# Patient Record
Sex: MALE | Race: WHITE | NOT HISPANIC OR LATINO | URBAN - METROPOLITAN AREA
[De-identification: names, ages, dates, MRNs, and addresses within clinical notes are randomized per-mention and may not be internally consistent; named-entity substitution may affect disease eponyms.]

---

## 2017-01-29 DIAGNOSIS — M26.629 TMJ ARTHRALGIA: ICD-10-CM

## 2017-01-30 RX ORDER — MELOXICAM 15 MG/1
TABLET ORAL
Qty: 30 TABLET | Refills: 3 | OUTPATIENT
Start: 2017-01-30

## 2017-03-06 ENCOUNTER — OFFICE VISIT (OUTPATIENT)
Dept: INTERNAL MEDICINE | Age: 57
End: 2017-03-06

## 2017-03-06 VITALS
BODY MASS INDEX: 26.68 KG/M2 | SYSTOLIC BLOOD PRESSURE: 120 MMHG | DIASTOLIC BLOOD PRESSURE: 76 MMHG | WEIGHT: 190.6 LBS | RESPIRATION RATE: 12 BRPM | HEIGHT: 71 IN | HEART RATE: 72 BPM

## 2017-03-06 DIAGNOSIS — M26.623 BILATERAL TEMPOROMANDIBULAR JOINT PAIN: ICD-10-CM

## 2017-03-06 DIAGNOSIS — I10 BENIGN ESSENTIAL HTN: Primary | ICD-10-CM

## 2017-03-06 DIAGNOSIS — M54.2 CERVICALGIA: ICD-10-CM

## 2017-03-06 PROCEDURE — 99214 OFFICE O/P EST MOD 30 MIN: CPT | Performed by: INTERNAL MEDICINE

## 2017-03-06 RX ORDER — MELOXICAM 15 MG/1
15 TABLET ORAL DAILY
Qty: 90 TABLET | Refills: 1 | Status: SHIPPED | OUTPATIENT
Start: 2017-03-06 | End: 2017-09-07 | Stop reason: SDUPTHER

## 2017-03-06 RX ORDER — METHYLPREDNISOLONE 4 MG/1
TABLET ORAL
Qty: 21 TABLET | Refills: 0 | Status: SHIPPED | OUTPATIENT
Start: 2017-03-06 | End: 2017-04-24

## 2017-03-06 RX ORDER — VALSARTAN 320 MG/1
320 TABLET ORAL DAILY
Qty: 90 TABLET | Refills: 1 | Status: SHIPPED | OUTPATIENT
Start: 2017-03-06 | End: 2017-09-05 | Stop reason: SDUPTHER

## 2017-03-06 NOTE — PROGRESS NOTES
Antony Gimenez is a 56 y.o. male who presents with   Chief Complaint   Patient presents with   • Neck Pain     Neck pain for several weeks with radiation into the right arm manifest as tingling.  This happens when he flexes his neck he says.   • Hypertension     Check blood pressure.  Blood pressures at home averaging around 120/70.   .    Neck Pain    This is a new problem. The current episode started 1 to 4 weeks ago. The problem has been unchanged. The pain is present in the right side. The quality of the pain is described as burning and aching. The symptoms are aggravated by bending. Associated symptoms include numbness. He has tried nothing for the symptoms.   Hypertension   This is a chronic problem. The current episode started more than 1 year ago. The problem is controlled. Associated symptoms include neck pain. Past treatments include angiotensin blockers. The current treatment provides moderate improvement. There are no compliance problems.         The following portions of the patient's history were reviewed and updated as appropriate: allergies, current medications, past medical history and problem list.    Review of Systems   Constitutional: Negative.    HENT: Negative.    Eyes: Negative.    Respiratory: Negative.    Cardiovascular: Negative.    Genitourinary: Negative.    Musculoskeletal: Positive for neck pain.   Skin: Negative.    Neurological: Positive for numbness.   Psychiatric/Behavioral: Negative.        Objective   Physical Exam   Constitutional: He is oriented to person, place, and time. He appears well-developed and well-nourished. No distress.   HENT:   Head: Normocephalic and atraumatic.   Eyes: Conjunctivae and EOM are normal. Pupils are equal, round, and reactive to light.   Neck: Normal range of motion. Neck supple. No thyromegaly present.   Neck exam negative.  Carotid auscultation normal-no bruits heard.   Cardiovascular: Normal rate, regular rhythm, normal heart sounds and intact  distal pulses.  Exam reveals no gallop and no friction rub.    No murmur heard.  Pulmonary/Chest: Effort normal and breath sounds normal. No respiratory distress. He has no wheezes. He has no rales. He exhibits no tenderness.   Musculoskeletal: Normal range of motion. He exhibits no edema, tenderness or deformity.   Subjectively he has tingling in his right arm when he flexes his head but the overall motor/sensory exam in his upper extremities appears normal.  Distal pulsations appear normal on palpation in both upper extremities as well.   Neurological: He is alert and oriented to person, place, and time. No cranial nerve deficit. Coordination normal.   Psychiatric: He has a normal mood and affect. His behavior is normal. Judgment and thought content normal.   Nursing note and vitals reviewed.      Assessment/Plan   Antony was seen today for neck pain and hypertension.    Diagnoses and all orders for this visit:    Benign essential HTN  -     valsartan (DIOVAN) 320 MG tablet; Take 1 tablet by mouth Daily.    Bilateral temporomandibular joint pain  -     meloxicam (MOBIC) 15 MG tablet; Take 1 tablet by mouth Daily.    Cervicalgia  -     MRI Cervical Spine Without Contrast; Future  -     MethylPREDNISolone (MEDROL, JERI,) 4 MG tablet; Take as directed on package instructions.        Plan: Continue valsartan as above.  Refill given for #90 with one refill.    Continue meloxicam for TMJ.  Patient using dental orthosis for the problem.    Neck pain: Clinical history suggests cervicalgia with radiculopathy into the right arm.  We will get MRI to check it out in more detail, also we will prescribe a Medrol Dosepak for the symptoms.  Follow-up depending MRI findings and response to Medrol.  The patient does see [facility] for orthopedic issues he says.

## 2017-03-15 ENCOUNTER — HOSPITAL ENCOUNTER (OUTPATIENT)
Dept: MRI IMAGING | Facility: HOSPITAL | Age: 57
Discharge: HOME | End: 2017-03-15
Admitting: INTERNAL MEDICINE

## 2017-03-15 DIAGNOSIS — M54.2 CERVICALGIA: ICD-10-CM

## 2017-03-15 PROCEDURE — 82565 ASSAY OF CREATININE: CPT

## 2017-03-15 PROCEDURE — 72156 MRI NECK SPINE W/O & W/DYE: CPT

## 2017-03-15 PROCEDURE — 0 GADOBENATE DIMEGLUMINE 529 MG/ML SOLUTION: Performed by: INTERNAL MEDICINE

## 2017-03-15 PROCEDURE — A9577 INJ MULTIHANCE: HCPCS | Performed by: INTERNAL MEDICINE

## 2017-03-15 RX ADMIN — GADOBENATE DIMEGLUMINE 18 ML: 529 INJECTION, SOLUTION INTRAVENOUS at 18:20

## 2017-03-16 LAB — CREAT BLDA-MCNC: 0.9 MG/DL (ref 0.6–1.3)

## 2017-03-23 ENCOUNTER — TELEPHONE (OUTPATIENT)
Dept: INTERNAL MEDICINE | Age: 57
End: 2017-03-23

## 2017-03-23 NOTE — TELEPHONE ENCOUNTER
Called patient 3-23-17 @ 1:33 per  for the first time on the MRI, no note given prior to this one. Please place the referral to the neurosurgeon per the patient's request. Medication did not help the problem.

## 2017-03-23 NOTE — TELEPHONE ENCOUNTER
I have already responded to this once.  MRI shows disc bulging at C4-5; C5-6 with multilevel degenerative joint disease.  If current treatment has been ineffective then a neurosurgical referral may be necessary.

## 2017-03-27 DIAGNOSIS — M50.30 DISC DISEASE, DEGENERATIVE, CERVICAL: ICD-10-CM

## 2017-03-27 DIAGNOSIS — M50.90 CERVICAL DISC DISEASE: Primary | ICD-10-CM

## 2017-04-24 ENCOUNTER — OFFICE VISIT (OUTPATIENT)
Dept: NEUROSURGERY | Facility: CLINIC | Age: 57
End: 2017-04-24

## 2017-04-24 VITALS
HEART RATE: 70 BPM | BODY MASS INDEX: 26.74 KG/M2 | HEIGHT: 71 IN | DIASTOLIC BLOOD PRESSURE: 87 MMHG | SYSTOLIC BLOOD PRESSURE: 139 MMHG | WEIGHT: 191 LBS

## 2017-04-24 DIAGNOSIS — M50.120 CERVICAL DISC DISORDER WITH RADICULOPATHY OF MID-CERVICAL REGION: Primary | ICD-10-CM

## 2017-04-24 PROCEDURE — 99243 OFF/OP CNSLTJ NEW/EST LOW 30: CPT | Performed by: PHYSICIAN ASSISTANT

## 2017-04-24 NOTE — PROGRESS NOTES
Subjective   Patient ID: Antony Gimenez is a 56 y.o. male is being seen for consultation today at the request of Melvin Lambert MD on neck pain that radiates into the right arm down to the hand. He states that the pain is better but his mobility is worse. He is currently taking Mobic 15 mg PRN for pain.    Neck Pain    This is a recurrent problem. The current episode started more than 1 month ago. The problem occurs intermittently. The problem has been gradually improving. The pain is associated with nothing. The pain is present in the right side. The quality of the pain is described as shooting. The pain is at a severity of 2/10. The pain is mild. The symptoms are aggravated by position. Worse during: depends on movement. Stiffness is present all day. Associated symptoms include numbness and tingling. Pertinent negatives include no fever, headaches or weakness. He has tried NSAIDs for the symptoms. The treatment provided moderate relief.       The following portions of the patient's history were reviewed and updated as appropriate: allergies, current medications, past family history, past medical history, past social history, past surgical history and problem list.    Review of Systems   Constitutional: Negative for fever.   Genitourinary: Negative for difficulty urinating.   Musculoskeletal: Positive for neck pain and neck stiffness. Negative for back pain and gait problem.   Neurological: Positive for tingling and numbness. Negative for weakness and headaches.   All other systems reviewed and are negative.      Objective   Physical Exam   Constitutional: He is oriented to person, place, and time. He appears well-developed and well-nourished.   HENT:   Head: Normocephalic and atraumatic.   Right Ear: External ear normal.   Left Ear: External ear normal.   Eyes: Conjunctivae and EOM are normal. Pupils are equal, round, and reactive to light. Right eye exhibits no discharge. Left eye exhibits no discharge.   Neck:  Normal range of motion. Neck supple. No tracheal deviation present.   Cardiovascular: Normal rate and regular rhythm.    Pulmonary/Chest: Effort normal and breath sounds normal. No stridor. No respiratory distress.   Abdominal: Soft. Bowel sounds are normal.   Musculoskeletal: Normal range of motion. He exhibits no edema, tenderness or deformity.   Neurological: He is alert and oriented to person, place, and time. He has normal strength. He displays no atrophy, no tremor and normal reflexes. No cranial nerve deficit or sensory deficit. He exhibits normal muscle tone. He displays a negative Romberg sign. He displays no seizure activity. Coordination and gait normal.   Reflex Scores:       Tricep reflexes are 1+ on the right side and 1+ on the left side.       Bicep reflexes are 1+ on the right side and 1+ on the left side.       Brachioradialis reflexes are 1+ on the right side and 1+ on the left side.  No long tract signs    R handed   Skin: Skin is warm and dry.   Psychiatric: He has a normal mood and affect. His behavior is normal. Judgment and thought content normal.   Nursing note and vitals reviewed.    Neurologic Exam     Mental Status   Oriented to person, place, and time.     Cranial Nerves     CN III, IV, VI   Pupils are equal, round, and reactive to light.  Extraocular motions are normal.     Motor Exam     Strength   Strength 5/5 throughout.     Gait, Coordination, and Reflexes     Reflexes   Right brachioradialis: 1+  Left brachioradialis: 1+  Right biceps: 1+  Left biceps: 1+  Right triceps: 1+  Left triceps: 1+      Assessment/Plan   Independent Review of Radiographic Studies:    I did review the cervical spine MRI from March 15, 2017 which shows a right-sided disc protrusion at C4-C5 and C5-C6 with mild to moderate central stenosis but moderate to severe neural foraminal narrowing at both levels.  Medical Decision Making:    Mr. Gimenez was referred to us by Dr. Lambert for a 6 month history of right arm  pain and tingling.  The symptoms began actually over a year ago with some mild left-sided upper arm pain.  Those symptoms resolved without formal treatment but then shifted over to the right.  The pain is mild, described as electrical/tingling and occurs intermittently depending on the position of his head and neck.  He can alleviate the symptoms if he looks down and to the left and exacerbate his symptoms with rotation to the right.  He has noticed loss of range of motion in the neck but denies weakness or incontinence or gait issues.  The discomfort is mostly along the bicep and proximal forearm.  He has used NSAIDs with some relief (Mobic).     I did review the MRI images with the patient.  At this point his pain is quite mild and he does not have any deficits.  He understands that if he begins to experience weakness or conservative management fails to address the radicular pain then surgery will be indicated.  However I do think it is worthwhile to try some conservative management and we discussed both therapy and epidurals.  Since his pain is mild I think therapy would be a much wiser choice and the patient did agree.  He will try therapy for 6 weeks and then return to see us.  He can call in the interim with any questions or concerns or any worsening or new symptoms.  Antony was seen today for neck pain.    Diagnoses and all orders for this visit:    Cervical disc disorder with radiculopathy of mid-cervical region  -     Ambulatory Referral to Physical Therapy Evaluate and treat (2-3 times per week for 6wks, light traction, ROM, modalities, strength)    Return in about 6 weeks (around 6/5/2017) for with Dr. Santa.

## 2017-07-05 ENCOUNTER — OFFICE VISIT (OUTPATIENT)
Dept: NEUROSURGERY | Facility: CLINIC | Age: 57
End: 2017-07-05

## 2017-07-05 VITALS
HEART RATE: 67 BPM | DIASTOLIC BLOOD PRESSURE: 91 MMHG | BODY MASS INDEX: 25.76 KG/M2 | HEIGHT: 71 IN | SYSTOLIC BLOOD PRESSURE: 132 MMHG | WEIGHT: 184 LBS

## 2017-07-05 DIAGNOSIS — R29.898 WEAKNESS OF RIGHT ARM: ICD-10-CM

## 2017-07-05 DIAGNOSIS — M50.121 CERVICAL DISC DISORDER AT C4-C5 LEVEL WITH RADICULOPATHY: Primary | ICD-10-CM

## 2017-07-05 DIAGNOSIS — M50.122 CERVICAL DISC DISORDER AT C5-C6 LEVEL WITH RADICULOPATHY: ICD-10-CM

## 2017-07-05 PROCEDURE — 99213 OFFICE O/P EST LOW 20 MIN: CPT | Performed by: NEUROLOGICAL SURGERY

## 2017-07-05 NOTE — PROGRESS NOTES
Subjective   Patient ID: Antony Gimenez is a 56 y.o. male is here today for follow-up of neck pain.    At the patient's last visit, he was referred to physical therapy.    Today, the patient notes that the therapy seems to be helping.  He notes a persistent buzzing sensation in his right arm.  He notes that he would also like to discuss his other bulging discs.  He notes persistent neck stiffness and decreased range of motion.      Neck Pain    This is a chronic problem. The current episode started more than 1 month ago. The problem occurs daily. The problem has been gradually improving. Pertinent negatives include no fever.       The following portions of the patient's history were reviewed and updated as appropriate: allergies, current medications, past family history, past medical history, past social history, past surgical history and problem list.    Review of Systems   Constitutional: Negative for fever.   Musculoskeletal: Positive for neck pain.   Neurological: Negative for syncope.   All other systems reviewed and are negative.    This patient is otherwise healthy.  He doesn't really have much pain, occasional stiffness in the neck.  He has about a year history of right shoulder and arm numbness and tingling.  He thinks there has been low bit of weakness over the last 2 months.  He is been to physical therapy.  Traction does seem to help.  Home cervical traction unit would be reasonable.  There is a difference in the size of his right versus left biceps.  I measured that.  There is a bit of weakness in the C6 root.  He does have 2 level cervical disc disease with osteophytic root impingement at C4-C5 and C5-C6.  I'm concerned about the mild atrophy in the weakness.  We'll give it 2 more months and have him monitor strength and finish out therapy.  If there is no significant forward progress, then a two-level ACDF at C4-C5 and C5-C6 best option.        Objective   Physical Exam   Constitutional: He is  oriented to person, place, and time. He appears well-developed and well-nourished.   HENT:   Head: Normocephalic and atraumatic.   Eyes: Conjunctivae and EOM are normal. Pupils are equal, round, and reactive to light.   Fundoscopic exam:       The right eye shows no papilledema. The right eye shows venous pulsations.        The left eye shows no papilledema. The left eye shows venous pulsations.   Neck: Carotid bruit is not present.   Neurological: He is oriented to person, place, and time. He has a normal Finger-Nose-Finger Test and a normal Heel to Shin Test. Gait normal.   Reflex Scores:       Tricep reflexes are 2+ on the right side and 2+ on the left side.       Bicep reflexes are 2+ on the right side and 2+ on the left side.       Brachioradialis reflexes are 2+ on the right side and 2+ on the left side.       Patellar reflexes are 2+ on the right side and 2+ on the left side.       Achilles reflexes are 2+ on the right side and 2+ on the left side.  Psychiatric: His speech is normal.     Neurologic Exam     Mental Status   Oriented to person, place, and time.   Registration of memory: Good recent and remote memory.   Attention: normal. Concentration: normal.   Speech: speech is normal   Level of consciousness: alert  Knowledge: consistent with education.     Cranial Nerves     CN II   Visual fields full to confrontation.   Visual acuity: normal    CN III, IV, VI   Pupils are equal, round, and reactive to light.  Extraocular motions are normal.     CN V   Facial sensation intact.   Right corneal reflex: normal  Left corneal reflex: normal    CN VII   Facial expression full, symmetric.   Right facial weakness: none  Left facial weakness: none    CN VIII   Hearing: intact    CN IX, X   Palate: symmetric    CN XI   Right sternocleidomastoid strength: normal  Left sternocleidomastoid strength: normal    CN XII   Tongue: not atrophic  Tongue deviation: none    Motor Exam   Muscle bulk: normal  Right arm tone:  normal  Left arm tone: normal  Right leg tone: normal  Left leg tone: normal    Strength   Strength 5/5 except as noted.   Right biceps: 4/5       Right bicep was 34 cm in circumference, left biceps is 34.75 cm in circumference.     Sensory Exam   Light touch normal.     Gait, Coordination, and Reflexes     Gait  Gait: normal    Coordination   Finger to nose coordination: normal  Heel to shin coordination: normal    Reflexes   Right brachioradialis: 2+  Left brachioradialis: 2+  Right biceps: 2+  Left biceps: 2+  Right triceps: 2+  Left triceps: 2+  Right patellar: 2+  Left patellar: 2+  Right achilles: 2+  Left achilles: 2+  Right : 2+  Left : 2+      Assessment/Plan   Independent Review of Radiographic Studies:    The cervical MRI was reviewed which did show osteophytic cervical disc disease at C4-C5 and C5-C6 with right sided nerve impingement.  I agree with the report.      Medical Decision Making:    Even though he doesn't have a lot of pain the differences in the biceps and some of the very mild weakness is of concern.  He'll finish out his physical therapy.  He will monitor strength when he works out.  I did give him a prescription from home pump up cervical traction unit.  We'll see him in 2 months.  If he doesn't feel the strength in the right bicep is improved and I think we need to talk about doing an ACDF at C4-C5 and C5-C6.      Antony was seen today for neck pain.    Diagnoses and all orders for this visit:    Cervical disc disorder at C4-C5 level with radiculopathy    Cervical disc disorder at C5-C6 level with radiculopathy    Weakness of right arm    Return in about 2 months (around 9/5/2017).

## 2017-09-05 ENCOUNTER — OFFICE VISIT (OUTPATIENT)
Dept: INTERNAL MEDICINE | Age: 57
End: 2017-09-05

## 2017-09-05 VITALS
RESPIRATION RATE: 12 BRPM | HEIGHT: 71 IN | HEART RATE: 70 BPM | DIASTOLIC BLOOD PRESSURE: 60 MMHG | BODY MASS INDEX: 26.18 KG/M2 | SYSTOLIC BLOOD PRESSURE: 110 MMHG | WEIGHT: 187 LBS

## 2017-09-05 DIAGNOSIS — I10 BENIGN ESSENTIAL HTN: ICD-10-CM

## 2017-09-05 PROCEDURE — 99213 OFFICE O/P EST LOW 20 MIN: CPT | Performed by: INTERNAL MEDICINE

## 2017-09-05 RX ORDER — VALSARTAN 320 MG/1
320 TABLET ORAL DAILY
Qty: 90 TABLET | Refills: 1 | Status: SHIPPED | OUTPATIENT
Start: 2017-09-05 | End: 2018-02-24 | Stop reason: SDUPTHER

## 2017-09-05 NOTE — PROGRESS NOTES
Antony Gimenez is a 56 y.o. male who presents with   Chief Complaint   Patient presents with   • Hypertension     Patient presents for a blood pressure checkup.  He is going to run out of medication in a few days and also will be leaving town for vacation.  He is due back on 25 September for a checkup/lab update visit.   .    Hypertension   This is a chronic problem. The current episode started more than 1 year ago. The problem is controlled. Past treatments include angiotensin blockers. The current treatment provides moderate improvement. There are no compliance problems.         The following portions of the patient's history were reviewed and updated as appropriate: current medications and problem list.    Review of Systems   Constitutional: Negative.    Respiratory: Negative.    Cardiovascular: Negative.    Neurological: Negative.    Psychiatric/Behavioral: Negative.        Objective   Physical Exam   Constitutional: He is oriented to person, place, and time. He appears well-developed and well-nourished.   HENT:   Head: Normocephalic and atraumatic.   Eyes: EOM are normal. Pupils are equal, round, and reactive to light.   Cardiovascular: Normal rate.    Pulmonary/Chest: Effort normal.   Neurological: He is alert and oriented to person, place, and time.   Psychiatric: He has a normal mood and affect. His behavior is normal. Judgment and thought content normal.   Nursing note and vitals reviewed.      Assessment/Plan   Antony was seen today for hypertension.    Diagnoses and all orders for this visit:    Benign essential HTN        Plan: Blood pressure well controlled on current treatment.  Continue the same.  Return for his six-month checkup/lab update visit on September 25 as planned.

## 2017-09-06 DIAGNOSIS — I10 BENIGN ESSENTIAL HTN: ICD-10-CM

## 2017-09-06 RX ORDER — VALSARTAN 320 MG/1
TABLET ORAL
Qty: 90 TABLET | Refills: 1 | Status: SHIPPED | OUTPATIENT
Start: 2017-09-06 | End: 2018-07-30

## 2017-09-07 DIAGNOSIS — M26.623 BILATERAL TEMPOROMANDIBULAR JOINT PAIN: ICD-10-CM

## 2017-09-08 RX ORDER — MELOXICAM 15 MG/1
15 TABLET ORAL DAILY
Qty: 30 TABLET | Refills: 1 | Status: SHIPPED | OUTPATIENT
Start: 2017-09-08 | End: 2017-11-16 | Stop reason: SDUPTHER

## 2017-09-25 ENCOUNTER — OFFICE VISIT (OUTPATIENT)
Dept: NEUROSURGERY | Facility: CLINIC | Age: 57
End: 2017-09-25

## 2017-09-25 VITALS
HEART RATE: 65 BPM | BODY MASS INDEX: 26.18 KG/M2 | HEIGHT: 71 IN | SYSTOLIC BLOOD PRESSURE: 137 MMHG | WEIGHT: 187 LBS | DIASTOLIC BLOOD PRESSURE: 79 MMHG

## 2017-09-25 DIAGNOSIS — M50.122 CERVICAL DISC DISORDER AT C5-C6 LEVEL WITH RADICULOPATHY: ICD-10-CM

## 2017-09-25 DIAGNOSIS — M50.121 CERVICAL DISC DISORDER AT C4-C5 LEVEL WITH RADICULOPATHY: Primary | ICD-10-CM

## 2017-09-25 DIAGNOSIS — R29.898 WEAKNESS OF RIGHT ARM: ICD-10-CM

## 2017-09-25 PROCEDURE — 99213 OFFICE O/P EST LOW 20 MIN: CPT | Performed by: NEUROLOGICAL SURGERY

## 2017-09-25 NOTE — PROGRESS NOTES
Subjective   Patient ID: Antony Gimenez is a 56 y.o. male is here today for follow-up on neck pain.    At last visit the patient stated that the physical therapy was helping with his pain. He still complained of a buzzing sensation in the right arm. He was having persistent neck stiffness and decreased range of motion.    Today the patient states that there have been no changes. He still has the neck stiffness and weakness in the right arm.    Neck Pain    This is a chronic problem. The current episode started more than 1 year ago. The problem occurs daily. The problem has been unchanged. Stiffness is present all day. Associated symptoms include weakness (right arm).       The following portions of the patient's history were reviewed and updated as appropriate: allergies, current medications, past family history, past medical history, past social history, past surgical history and problem list.    Review of Systems   Musculoskeletal: Positive for neck pain.   Neurological: Positive for weakness (right arm).   All other systems reviewed and are negative.    I have been following this patient for osteophytic cervical disk disease at C4-C5 and C5-C6.  There really has not been much in the way of pain, but there was more weakness and some asymmetry in his muscle bulk.  There is a little bit of atrophy on the right bicep, but he does not think the weakness has changed much.  It is quite subtle just about 4+ over 5 in the right bicep compared to the left.  He feels he can live with this.  I told him it could get worse and if it does he is to let us know. Also if he develops more pain in his neck and right arm, he should let us know.  Otherwise, we can keep it open ended.  The symptoms are not debilitating and he can live his life fairly normally.        Objective   Physical Exam   Constitutional: He is oriented to person, place, and time. He appears well-developed and well-nourished.   HENT:   Head: Normocephalic and  atraumatic.   Eyes: Conjunctivae and EOM are normal. Pupils are equal, round, and reactive to light.   Fundoscopic exam:       The right eye shows no papilledema. The right eye shows venous pulsations.        The left eye shows no papilledema. The left eye shows venous pulsations.   Neck: Carotid bruit is not present.   Neurological: He is oriented to person, place, and time. He has a normal Finger-Nose-Finger Test and a normal Heel to Shin Test. Gait normal.   Reflex Scores:       Tricep reflexes are 2+ on the right side and 2+ on the left side.       Bicep reflexes are 2+ on the right side and 2+ on the left side.       Brachioradialis reflexes are 2+ on the right side and 2+ on the left side.       Patellar reflexes are 2+ on the right side and 2+ on the left side.       Achilles reflexes are 2+ on the right side and 2+ on the left side.  Psychiatric: His speech is normal.     Neurologic Exam     Mental Status   Oriented to person, place, and time.   Registration of memory: Good recent and remote memory.   Attention: normal. Concentration: normal.   Speech: speech is normal   Level of consciousness: alert  Knowledge: consistent with education.     Cranial Nerves     CN II   Visual fields full to confrontation.   Visual acuity: normal    CN III, IV, VI   Pupils are equal, round, and reactive to light.  Extraocular motions are normal.     CN V   Facial sensation intact.   Right corneal reflex: normal  Left corneal reflex: normal    CN VII   Facial expression full, symmetric.   Right facial weakness: none  Left facial weakness: none    CN VIII   Hearing: intact    CN IX, X   Palate: symmetric    CN XI   Right sternocleidomastoid strength: normal  Left sternocleidomastoid strength: normal    CN XII   Tongue: not atrophic  Tongue deviation: none    Motor Exam   Muscle bulk: normal  Right arm tone: normal  Left arm tone: normal  Right leg tone: normal  Left leg tone: normal    Strength   Strength 5/5 except as noted.      Sensory Exam   Light touch normal.     Gait, Coordination, and Reflexes     Gait  Gait: normal    Coordination   Finger to nose coordination: normal  Heel to shin coordination: normal    Reflexes   Right brachioradialis: 2+  Left brachioradialis: 2+  Right biceps: 2+  Left biceps: 2+  Right triceps: 2+  Left triceps: 2+  Right patellar: 2+  Left patellar: 2+  Right achilles: 2+  Left achilles: 2+  Right : 2+  Left : 2+      Assessment/Plan   Independent Review of Radiographic Studies:    I reviewed the cervical MRI done earlier this year which does show osteophytic cervical disk disease at C4-C5 and C5-C6 with more right-sided impingement.  I agree with the report.      Medical Decision Making:    He feels he can live with this very mild weakness. I do not think there is any reason to intervene.  I told him we could keep it open ended. If this weakness gets worse on the right bicep, he should let us know. Also if he begins developing pain on the right shoulder and arm, he should be re-evaluated. Otherwise, will keep it p.r.n.       There are no diagnoses linked to this encounter.  No Follow-up on file.           Body mass index is 26.08 kg/(m^2).

## 2017-10-09 ENCOUNTER — OFFICE VISIT (OUTPATIENT)
Dept: INTERNAL MEDICINE | Age: 57
End: 2017-10-09

## 2017-10-09 VITALS
BODY MASS INDEX: 26.18 KG/M2 | RESPIRATION RATE: 12 BRPM | WEIGHT: 187 LBS | HEIGHT: 71 IN | DIASTOLIC BLOOD PRESSURE: 80 MMHG | HEART RATE: 72 BPM | SYSTOLIC BLOOD PRESSURE: 120 MMHG

## 2017-10-09 DIAGNOSIS — I10 BENIGN ESSENTIAL HTN: Primary | ICD-10-CM

## 2017-10-09 DIAGNOSIS — R73.9 HYPERGLYCEMIA: ICD-10-CM

## 2017-10-09 DIAGNOSIS — E78.2 MIXED HYPERLIPIDEMIA: ICD-10-CM

## 2017-10-09 LAB
ALBUMIN SERPL-MCNC: 4.4 G/DL (ref 3.5–5.2)
ALBUMIN/GLOB SERPL: 1.6 G/DL
ALP SERPL-CCNC: 56 U/L (ref 39–117)
ALT SERPL-CCNC: 19 U/L (ref 1–41)
AST SERPL-CCNC: 15 U/L (ref 1–40)
BILIRUB SERPL-MCNC: 0.5 MG/DL (ref 0.1–1.2)
BUN SERPL-MCNC: 13 MG/DL (ref 6–20)
BUN/CREAT SERPL: 13.5 (ref 7–25)
CALCIUM SERPL-MCNC: 9.5 MG/DL (ref 8.6–10.5)
CHLORIDE SERPL-SCNC: 100 MMOL/L (ref 98–107)
CHOLEST SERPL-MCNC: 195 MG/DL (ref 0–200)
CO2 SERPL-SCNC: 26.8 MMOL/L (ref 22–29)
CREAT SERPL-MCNC: 0.96 MG/DL (ref 0.76–1.27)
GLOBULIN SER CALC-MCNC: 2.8 GM/DL
GLUCOSE SERPL-MCNC: 112 MG/DL (ref 65–99)
HBA1C MFR BLD: 5 % (ref 4.8–5.6)
HDLC SERPL-MCNC: 60 MG/DL (ref 40–60)
LDLC SERPL CALC-MCNC: 116 MG/DL (ref 0–100)
POTASSIUM SERPL-SCNC: 4.4 MMOL/L (ref 3.5–5.2)
PROT SERPL-MCNC: 7.2 G/DL (ref 6–8.5)
SODIUM SERPL-SCNC: 138 MMOL/L (ref 136–145)
TRIGL SERPL-MCNC: 94 MG/DL (ref 0–150)
VLDLC SERPL CALC-MCNC: 18.8 MG/DL (ref 5–40)

## 2017-10-09 PROCEDURE — 99214 OFFICE O/P EST MOD 30 MIN: CPT | Performed by: INTERNAL MEDICINE

## 2017-10-09 NOTE — PROGRESS NOTES
Antony Gimenez is a 56 y.o. male who presents with   Chief Complaint   Patient presents with   • Hypertension     Checkup; lab updates   • Hyperlipidemia     As above.  Currently on no lipid lowering therapy.   • Blood Sugar Problem     As above.  Currently on no blood sugar lowering treatment.   .    Hypertension   This is a chronic problem. The current episode started more than 1 year ago. The problem is controlled. Past treatments include diuretics and angiotensin blockers. The current treatment provides moderate improvement. There are no compliance problems.    Hyperlipidemia   This is a chronic problem. The current episode started more than 1 year ago. The problem is controlled. Exacerbating diseases include diabetes. He is currently on no antihyperlipidemic treatment.   Blood Sugar Problem   This is a chronic problem. The current episode started more than 1 year ago. The problem has been waxing and waning. He has tried nothing for the symptoms.        The following portions of the patient's history were reviewed and updated as appropriate: allergies, current medications, past medical history and problem list.    Review of Systems   Constitutional: Negative.    HENT: Negative.    Eyes: Negative.    Respiratory: Negative.    Cardiovascular: Negative.    Genitourinary: Negative.    Musculoskeletal: Negative.    Skin: Negative.    Neurological: Negative.    Psychiatric/Behavioral: Negative.        Objective   Physical Exam   Constitutional: He is oriented to person, place, and time. He appears well-developed and well-nourished. No distress.   HENT:   Head: Normocephalic and atraumatic.   Eyes: Conjunctivae and EOM are normal. Pupils are equal, round, and reactive to light.   Neck: Normal range of motion. Neck supple. No thyromegaly present.   Neck exam negative.  Carotid auscultation normal-no bruits heard.   Cardiovascular: Normal rate, regular rhythm, normal heart sounds and intact distal pulses.  Exam  reveals no gallop and no friction rub.    No murmur heard.  Pulmonary/Chest: Effort normal and breath sounds normal. No respiratory distress. He has no wheezes. He has no rales. He exhibits no tenderness.   Neurological: He is alert and oriented to person, place, and time.   Psychiatric: He has a normal mood and affect. His behavior is normal. Judgment and thought content normal.   Nursing note and vitals reviewed.      Assessment/Plan   Antony was seen today for hypertension, hyperlipidemia and blood sugar problem.    Diagnoses and all orders for this visit:    Benign essential HTN  -     Comprehensive Metabolic Panel    Mixed hyperlipidemia  -     Comprehensive Metabolic Panel  -     Lipid Panel    Hyperglycemia  -     Comprehensive Metabolic Panel  -     Hemoglobin A1c      Plan: Labs as above.Today's exam unremarkable for any new problems or events.  Continue all current treatment as prescribed.  A follow-up checkup/lab update visit is advised for 6 months.

## 2017-11-16 DIAGNOSIS — M26.623 BILATERAL TEMPOROMANDIBULAR JOINT PAIN: ICD-10-CM

## 2017-11-16 RX ORDER — MELOXICAM 15 MG/1
TABLET ORAL
Qty: 30 TABLET | Refills: 5 | Status: SHIPPED | OUTPATIENT
Start: 2017-11-16 | End: 2018-05-27 | Stop reason: SDUPTHER

## 2018-02-24 DIAGNOSIS — I10 BENIGN ESSENTIAL HTN: ICD-10-CM

## 2018-02-26 RX ORDER — VALSARTAN 320 MG/1
TABLET ORAL
Qty: 90 TABLET | Refills: 1 | Status: SHIPPED | OUTPATIENT
Start: 2018-02-26 | End: 2018-04-10

## 2018-04-10 ENCOUNTER — OFFICE VISIT (OUTPATIENT)
Dept: INTERNAL MEDICINE | Age: 58
End: 2018-04-10

## 2018-04-10 VITALS
DIASTOLIC BLOOD PRESSURE: 70 MMHG | SYSTOLIC BLOOD PRESSURE: 126 MMHG | TEMPERATURE: 97.8 F | HEIGHT: 71 IN | RESPIRATION RATE: 12 BRPM | WEIGHT: 189 LBS | OXYGEN SATURATION: 98 % | HEART RATE: 73 BPM | BODY MASS INDEX: 26.46 KG/M2

## 2018-04-10 DIAGNOSIS — E78.2 MIXED HYPERLIPIDEMIA: ICD-10-CM

## 2018-04-10 DIAGNOSIS — I10 BENIGN ESSENTIAL HTN: Primary | ICD-10-CM

## 2018-04-10 LAB
ALBUMIN SERPL-MCNC: 4.4 G/DL (ref 3.5–5.2)
ALBUMIN/GLOB SERPL: 1.6 G/DL
ALP SERPL-CCNC: 55 U/L (ref 39–117)
ALT SERPL-CCNC: 24 U/L (ref 1–41)
AST SERPL-CCNC: 13 U/L (ref 1–40)
BILIRUB SERPL-MCNC: 0.4 MG/DL (ref 0.1–1.2)
BUN SERPL-MCNC: 11 MG/DL (ref 6–20)
BUN/CREAT SERPL: 12.6 (ref 7–25)
CALCIUM SERPL-MCNC: 9.4 MG/DL (ref 8.6–10.5)
CHLORIDE SERPL-SCNC: 102 MMOL/L (ref 98–107)
CHOLEST SERPL-MCNC: 197 MG/DL (ref 0–200)
CO2 SERPL-SCNC: 28.2 MMOL/L (ref 22–29)
CREAT SERPL-MCNC: 0.87 MG/DL (ref 0.76–1.27)
GFR SERPLBLD CREATININE-BSD FMLA CKD-EPI: 110 ML/MIN/1.73
GFR SERPLBLD CREATININE-BSD FMLA CKD-EPI: 91 ML/MIN/1.73
GLOBULIN SER CALC-MCNC: 2.8 GM/DL
GLUCOSE SERPL-MCNC: 112 MG/DL (ref 65–99)
HDLC SERPL-MCNC: 59 MG/DL (ref 40–60)
LDLC SERPL CALC-MCNC: 124 MG/DL (ref 0–100)
POTASSIUM SERPL-SCNC: 5 MMOL/L (ref 3.5–5.2)
PROT SERPL-MCNC: 7.2 G/DL (ref 6–8.5)
SODIUM SERPL-SCNC: 142 MMOL/L (ref 136–145)
TRIGL SERPL-MCNC: 70 MG/DL (ref 0–150)
VLDLC SERPL CALC-MCNC: 14 MG/DL (ref 5–40)

## 2018-04-10 PROCEDURE — 99214 OFFICE O/P EST MOD 30 MIN: CPT | Performed by: INTERNAL MEDICINE

## 2018-04-10 NOTE — PROGRESS NOTES
Antony Gimenez is a 57 y.o. male who presents with   Chief Complaint   Patient presents with   • Hypertension   • Hyperlipidemia   .    57-year-old male presents for his six-month checkup/lab update visit.  He says that he is going to the gym and exercising regularly and is eating less than he used to and consuming less alcohol than he used to.  His lipid profile was better last time that it has been in the last 5 years and he says he is feeling great.  He continues to see his urologist-Dr. Moncada-for prostate cancer follow-up and recent PSA was slightly above 1.0 he says but his urologist was happy with that.  He is currently taking Viagra for erectile issues but this is being treated and managed by his urologist.      Hypertension   This is a chronic problem. The current episode started more than 1 year ago. The problem is controlled. Past treatments include angiotensin blockers. The current treatment provides moderate improvement. There are no compliance problems.    Hyperlipidemia   This is a chronic problem. The current episode started more than 1 year ago. The problem is controlled. Recent lipid tests were reviewed and are normal. He is currently on no antihyperlipidemic treatment.        The following portions of the patient's history were reviewed and updated as appropriate: allergies, current medications, past medical history and problem list.    Review of Systems   Constitutional: Negative.    HENT: Negative.    Eyes: Negative.    Respiratory: Negative.    Cardiovascular: Negative.    Genitourinary: Negative.    Musculoskeletal: Negative.    Skin: Negative.    Neurological: Negative.    Psychiatric/Behavioral: Negative.        Objective   Physical Exam   Constitutional: He is oriented to person, place, and time. He appears well-developed and well-nourished. No distress.   HENT:   Head: Normocephalic and atraumatic.   Eyes: Conjunctivae and EOM are normal. Pupils are equal, round, and reactive to light.    Neck: Normal range of motion. Neck supple. No thyromegaly present.   Neck exam negative.  Carotid auscultation normal-no bruits heard.   Cardiovascular: Normal rate, regular rhythm, normal heart sounds and intact distal pulses.  Exam reveals no gallop and no friction rub.    No murmur heard.  Pulmonary/Chest: Effort normal and breath sounds normal. No respiratory distress. He has no wheezes. He has no rales. He exhibits no tenderness.   Neurological: He is alert and oriented to person, place, and time.   Psychiatric: He has a normal mood and affect. His behavior is normal. Judgment and thought content normal.   Nursing note and vitals reviewed.      Assessment/Plan   Antony was seen today for hypertension and hyperlipidemia.    Diagnoses and all orders for this visit:    Benign essential HTN  -     Comprehensive Metabolic Panel    Mixed hyperlipidemia  -     Comprehensive Metabolic Panel  -     Lipid Panel      Plan: Labs as above.Today's exam unremarkable for any new problems or events.  Continue all current treatment as prescribed.  A follow-up checkup/lab update visit is advised for 6 months assuming today's labs are all acceptable.

## 2018-04-11 NOTE — PROGRESS NOTES
Mild elevation of the blood sugar is noted at 112 but at this point it is not of such degree that any new prescription medication is warranted.  Would strongly urge continued workout program and continued weight control with minimizing sweets intake and carbohydrate intake.  All other labs are acceptable.  Continue all current medications as prescribed.  A follow-up office visit with lab updates in 6 months is advised.

## 2018-05-27 DIAGNOSIS — M26.623 BILATERAL TEMPOROMANDIBULAR JOINT PAIN: ICD-10-CM

## 2018-05-29 RX ORDER — MELOXICAM 15 MG/1
TABLET ORAL
Qty: 30 TABLET | Refills: 3 | Status: SHIPPED | OUTPATIENT
Start: 2018-05-29 | End: 2018-09-24 | Stop reason: SDUPTHER

## 2018-07-30 ENCOUNTER — TELEPHONE (OUTPATIENT)
Dept: INTERNAL MEDICINE | Age: 58
End: 2018-07-30

## 2018-07-30 RX ORDER — LOSARTAN POTASSIUM 100 MG/1
100 TABLET ORAL DAILY
Qty: 30 TABLET | Refills: 5 | Status: SHIPPED | OUTPATIENT
Start: 2018-07-30 | End: 2018-12-19 | Stop reason: SDUPTHER

## 2018-07-30 NOTE — TELEPHONE ENCOUNTER
Send him Hyzaar (losartan) 100 mg daily-number 30-5 refills.  Advised him that yes we know the dosage is  different than his valsartan 320 mg but since they are different medications the doses are equivalent in potency

## 2018-07-30 NOTE — TELEPHONE ENCOUNTER
Patient is taking Valsartin and it has been recalled. Please send new B/P medication to pharmacy on file.

## 2018-08-27 DIAGNOSIS — I10 BENIGN ESSENTIAL HTN: ICD-10-CM

## 2018-08-27 RX ORDER — VALSARTAN 320 MG/1
TABLET ORAL
Qty: 90 TABLET | Refills: 1 | OUTPATIENT
Start: 2018-08-27

## 2018-09-24 DIAGNOSIS — M26.623 BILATERAL TEMPOROMANDIBULAR JOINT PAIN: ICD-10-CM

## 2018-09-24 RX ORDER — MELOXICAM 15 MG/1
15 TABLET ORAL DAILY
Qty: 90 TABLET | Refills: 0 | Status: SHIPPED | OUTPATIENT
Start: 2018-09-24 | End: 2018-12-11 | Stop reason: SDUPTHER

## 2018-10-15 ENCOUNTER — OFFICE VISIT (OUTPATIENT)
Dept: INTERNAL MEDICINE | Age: 58
End: 2018-10-15

## 2018-10-15 VITALS
TEMPERATURE: 96.9 F | HEART RATE: 76 BPM | HEIGHT: 71 IN | RESPIRATION RATE: 13 BRPM | DIASTOLIC BLOOD PRESSURE: 68 MMHG | OXYGEN SATURATION: 99 % | WEIGHT: 184 LBS | SYSTOLIC BLOOD PRESSURE: 128 MMHG | BODY MASS INDEX: 25.76 KG/M2

## 2018-10-15 DIAGNOSIS — I10 BENIGN ESSENTIAL HTN: Primary | ICD-10-CM

## 2018-10-15 DIAGNOSIS — E78.2 MIXED HYPERLIPIDEMIA: ICD-10-CM

## 2018-10-15 DIAGNOSIS — R73.9 HYPERGLYCEMIA: ICD-10-CM

## 2018-10-15 LAB
ALBUMIN SERPL-MCNC: 4.7 G/DL (ref 3.5–5.2)
ALBUMIN/GLOB SERPL: 1.8 G/DL
ALP SERPL-CCNC: 56 U/L (ref 39–117)
ALT SERPL-CCNC: 18 U/L (ref 1–41)
AST SERPL-CCNC: 14 U/L (ref 1–40)
BILIRUB SERPL-MCNC: 0.4 MG/DL (ref 0.1–1.2)
BUN SERPL-MCNC: 10 MG/DL (ref 6–20)
BUN/CREAT SERPL: 11.8 (ref 7–25)
CALCIUM SERPL-MCNC: 9.9 MG/DL (ref 8.6–10.5)
CHLORIDE SERPL-SCNC: 102 MMOL/L (ref 98–107)
CHOLEST SERPL-MCNC: 186 MG/DL (ref 0–200)
CO2 SERPL-SCNC: 27.3 MMOL/L (ref 22–29)
CREAT SERPL-MCNC: 0.85 MG/DL (ref 0.76–1.27)
GLOBULIN SER CALC-MCNC: 2.6 GM/DL
GLUCOSE SERPL-MCNC: 112 MG/DL (ref 65–99)
HBA1C MFR BLD: 4.7 % (ref 4.8–5.6)
HDLC SERPL-MCNC: 64 MG/DL (ref 40–60)
LDLC SERPL CALC-MCNC: 107 MG/DL (ref 0–100)
POTASSIUM SERPL-SCNC: 4.5 MMOL/L (ref 3.5–5.2)
PROT SERPL-MCNC: 7.3 G/DL (ref 6–8.5)
SODIUM SERPL-SCNC: 142 MMOL/L (ref 136–145)
TRIGL SERPL-MCNC: 77 MG/DL (ref 0–150)
VLDLC SERPL CALC-MCNC: 15.4 MG/DL (ref 5–40)

## 2018-10-15 PROCEDURE — 99214 OFFICE O/P EST MOD 30 MIN: CPT | Performed by: INTERNAL MEDICINE

## 2018-10-15 NOTE — PROGRESS NOTES
Antony Gimenez is a 57 y.o. male who presents with   Chief Complaint   Patient presents with   • Hypertension   • Hyperlipidemia   • Blood Sugar Problem   .    57-year-old male presents for his six-month checkup/lab update visit.  No complaints or problems on today's visit.      Hypertension   This is a chronic problem. The current episode started more than 1 year ago. The problem is controlled. Current antihypertension treatment includes angiotensin blockers. The current treatment provides moderate improvement. There are no compliance problems.    Hyperlipidemia   This is a chronic problem. The problem is controlled. Recent lipid tests were reviewed and are normal. Exacerbating diseases include diabetes. Current antihyperlipidemic treatment includes diet change. The current treatment provides moderate improvement of lipids. There are no compliance problems.    Blood Sugar Problem   This is a chronic problem. The current episode started more than 1 year ago. The problem has been waxing and waning. Treatments tried: Dietary changes/weight control/ exercise. The treatment provided moderate relief.        The following portions of the patient's history were reviewed and updated as appropriate: allergies, current medications, past medical history and problem list.    Review of Systems   Constitutional: Negative.    HENT: Negative.    Eyes: Negative.    Respiratory: Negative.    Cardiovascular: Negative.    Genitourinary: Negative.    Musculoskeletal: Negative.    Skin: Negative.    Neurological: Negative.    Psychiatric/Behavioral: Negative.        Objective   Physical Exam   Constitutional: He is oriented to person, place, and time. He appears well-developed and well-nourished. No distress.   HENT:   Head: Normocephalic and atraumatic.   Eyes: Pupils are equal, round, and reactive to light. Conjunctivae and EOM are normal.   Neck: Normal range of motion. Neck supple. No thyromegaly present.   Neck exam negative.   Carotid auscultation normal-no bruits heard.   Cardiovascular: Normal rate, regular rhythm, normal heart sounds and intact distal pulses.  Exam reveals no gallop and no friction rub.    No murmur heard.  Pulmonary/Chest: Effort normal and breath sounds normal. No respiratory distress. He has no wheezes. He has no rales. He exhibits no tenderness.   Neurological: He is alert and oriented to person, place, and time.   Psychiatric: He has a normal mood and affect. His behavior is normal. Judgment and thought content normal.   Nursing note and vitals reviewed.      Assessment/Plan   Antony was seen today for hypertension, hyperlipidemia and blood sugar problem.    Diagnoses and all orders for this visit:    Benign essential HTN  -     Comprehensive Metabolic Panel    Mixed hyperlipidemia  -     Comprehensive Metabolic Panel  -     Lipid Panel    Hyperglycemia  -     Comprehensive Metabolic Panel  -     Hemoglobin A1c      Plan: Labs as above.Today's exam unremarkable for any new problems or events.  Continue all current treatment as prescribed.  A follow-up checkup/lab update visit is advised for 6 months assuming today's labs are all acceptable.

## 2018-10-16 NOTE — PROGRESS NOTES
Sugar 112. A1c normal @ 4.7. All other labs are within normal / acceptable ranges. Continue all current meds as they are. A followup visit to be seen and have labs updated in six months is advised.

## 2018-12-11 DIAGNOSIS — M26.623 BILATERAL TEMPOROMANDIBULAR JOINT PAIN: ICD-10-CM

## 2018-12-11 RX ORDER — MELOXICAM 15 MG/1
TABLET ORAL
Qty: 90 TABLET | Refills: 0 | Status: SHIPPED | OUTPATIENT
Start: 2018-12-11 | End: 2018-12-21 | Stop reason: SDUPTHER

## 2018-12-19 RX ORDER — LOSARTAN POTASSIUM 100 MG/1
TABLET ORAL
Qty: 30 TABLET | Refills: 4 | Status: SHIPPED | OUTPATIENT
Start: 2018-12-19 | End: 2019-03-19 | Stop reason: SDUPTHER

## 2018-12-21 DIAGNOSIS — M26.623 BILATERAL TEMPOROMANDIBULAR JOINT PAIN: ICD-10-CM

## 2018-12-21 RX ORDER — MELOXICAM 15 MG/1
15 TABLET ORAL DAILY
Qty: 90 TABLET | Refills: 0 | Status: SHIPPED | OUTPATIENT
Start: 2018-12-21 | End: 2019-05-31 | Stop reason: SDUPTHER

## 2019-03-19 DIAGNOSIS — I10 BENIGN ESSENTIAL HTN: Primary | ICD-10-CM

## 2019-03-19 RX ORDER — LOSARTAN POTASSIUM 100 MG/1
TABLET ORAL
Qty: 30 TABLET | Refills: 0 | Status: SHIPPED | OUTPATIENT
Start: 2019-03-19 | End: 2019-03-19 | Stop reason: SDUPTHER

## 2019-03-19 RX ORDER — LOSARTAN POTASSIUM 100 MG/1
100 TABLET ORAL DAILY
Qty: 90 TABLET | Refills: 0 | Status: SHIPPED | OUTPATIENT
Start: 2019-03-19 | End: 2019-06-13 | Stop reason: SDUPTHER

## 2019-04-22 ENCOUNTER — OFFICE VISIT (OUTPATIENT)
Dept: INTERNAL MEDICINE | Age: 59
End: 2019-04-22

## 2019-04-22 VITALS
HEIGHT: 71 IN | OXYGEN SATURATION: 98 % | RESPIRATION RATE: 13 BRPM | WEIGHT: 188 LBS | SYSTOLIC BLOOD PRESSURE: 120 MMHG | TEMPERATURE: 97.5 F | HEART RATE: 72 BPM | BODY MASS INDEX: 26.32 KG/M2 | DIASTOLIC BLOOD PRESSURE: 80 MMHG

## 2019-04-22 DIAGNOSIS — I10 BENIGN ESSENTIAL HTN: Primary | ICD-10-CM

## 2019-04-22 DIAGNOSIS — E78.2 MIXED HYPERLIPIDEMIA: ICD-10-CM

## 2019-04-22 PROCEDURE — 99214 OFFICE O/P EST MOD 30 MIN: CPT | Performed by: INTERNAL MEDICINE

## 2019-04-22 NOTE — PROGRESS NOTES
"  Antony Gimenez is a 58 y.o. male who presents with   Chief Complaint   Patient presents with   • Hypertension   • Hyperlipidemia   .    58-year-old male.  Blood pressure checkup.  Patient declines labs today feeling they are \"not necessary to do but once a year now since they are consistently acceptable\".  Also he is checking his blood pressure at home and it is consistently around 120/80.  His weight remains stable in the 184-188 range.      Hypertension   This is a chronic problem. The current episode started more than 1 year ago. Current antihypertension treatment includes angiotensin blockers. The current treatment provides significant improvement. There are no compliance problems.    Hyperlipidemia   The current episode started more than 1 year ago. The problem is controlled. Recent lipid tests were reviewed and are normal. Current antihyperlipidemic treatment includes diet change and exercise. The current treatment provides significant improvement of lipids. There are no compliance problems.         The following portions of the patient's history were reviewed and updated as appropriate: allergies, current medications, past medical history and problem list.    Review of Systems   Constitutional: Negative.    HENT: Negative.    Eyes: Negative.    Respiratory: Negative.    Cardiovascular: Negative.    Genitourinary: Negative.    Musculoskeletal: Negative.    Skin: Negative.    Neurological: Negative.    Psychiatric/Behavioral: Negative.        Objective   Physical Exam   Constitutional: He is oriented to person, place, and time. He appears well-developed and well-nourished. No distress.   HENT:   Head: Normocephalic and atraumatic.   Eyes: Conjunctivae and EOM are normal. Pupils are equal, round, and reactive to light.   Neck: Normal range of motion. Neck supple. No thyromegaly present.   Neck exam negative.  Carotid auscultation normal-no bruits heard.   Cardiovascular: Normal rate, regular rhythm, normal " heart sounds and intact distal pulses. Exam reveals no gallop and no friction rub.   No murmur heard.  Pulmonary/Chest: Effort normal and breath sounds normal. No respiratory distress. He has no wheezes. He has no rales. He exhibits no tenderness.   Neurological: He is alert and oriented to person, place, and time.   Psychiatric: He has a normal mood and affect. His behavior is normal. Judgment and thought content normal.   Nursing note and vitals reviewed.      Assessment/Plan   Antony was seen today for hypertension and hyperlipidemia.    Diagnoses and all orders for this visit:    Benign essential HTN    Mixed hyperlipidemia        Plan: Blood pressure continues to be stable at 120/80.  His weight and lipids remain stable as well.  We will plan on seeing him in October for a checkup/lab update visit.  At that point in time if his blood pressure, weight and lab results all are not significantly changed then we will likely start seeing him on a yearly basis for a checkup/lab update visits.

## 2019-05-31 DIAGNOSIS — M26.623 BILATERAL TEMPOROMANDIBULAR JOINT PAIN: ICD-10-CM

## 2019-05-31 RX ORDER — MELOXICAM 15 MG/1
TABLET ORAL
Qty: 90 TABLET | Refills: 0 | Status: SHIPPED | OUTPATIENT
Start: 2019-05-31 | End: 2020-02-27 | Stop reason: SDUPTHER

## 2019-06-13 DIAGNOSIS — I10 BENIGN ESSENTIAL HTN: ICD-10-CM

## 2019-06-13 RX ORDER — LOSARTAN POTASSIUM 100 MG/1
TABLET ORAL
Qty: 90 TABLET | Refills: 0 | Status: SHIPPED | OUTPATIENT
Start: 2019-06-13 | End: 2019-10-01 | Stop reason: SDUPTHER

## 2019-10-01 DIAGNOSIS — I10 BENIGN ESSENTIAL HTN: ICD-10-CM

## 2019-10-01 RX ORDER — LOSARTAN POTASSIUM 100 MG/1
TABLET ORAL
Qty: 90 TABLET | Refills: 0 | Status: SHIPPED | OUTPATIENT
Start: 2019-10-01 | End: 2020-01-08

## 2020-01-08 DIAGNOSIS — I10 BENIGN ESSENTIAL HTN: ICD-10-CM

## 2020-01-08 RX ORDER — LOSARTAN POTASSIUM 100 MG/1
TABLET ORAL
Qty: 30 TABLET | Refills: 0 | Status: SHIPPED | OUTPATIENT
Start: 2020-01-08 | End: 2020-02-05

## 2020-02-05 DIAGNOSIS — I10 BENIGN ESSENTIAL HTN: ICD-10-CM

## 2020-02-05 RX ORDER — LOSARTAN POTASSIUM 100 MG/1
TABLET ORAL
Qty: 30 TABLET | Refills: 0 | Status: SHIPPED | OUTPATIENT
Start: 2020-02-05 | End: 2020-02-27 | Stop reason: SDUPTHER

## 2020-02-05 NOTE — TELEPHONE ENCOUNTER
LOV 4/22/2019  NOV  NOT [facility]    LOV that was [facility] was NO SHOW.    Pt notified that meds will be refilled #30, 0 RF until seen.   Pt instructed to call office to [facility] appt. MM

## 2020-02-27 ENCOUNTER — OFFICE VISIT (OUTPATIENT)
Dept: INTERNAL MEDICINE | Age: 60
End: 2020-02-27

## 2020-02-27 VITALS
BODY MASS INDEX: 26.35 KG/M2 | OXYGEN SATURATION: 98 % | DIASTOLIC BLOOD PRESSURE: 100 MMHG | RESPIRATION RATE: 13 BRPM | WEIGHT: 188.2 LBS | HEIGHT: 71 IN | HEART RATE: 73 BPM | TEMPERATURE: 98.8 F | SYSTOLIC BLOOD PRESSURE: 148 MMHG

## 2020-02-27 DIAGNOSIS — M26.623 BILATERAL TEMPOROMANDIBULAR JOINT PAIN: ICD-10-CM

## 2020-02-27 DIAGNOSIS — E78.2 MIXED HYPERLIPIDEMIA: Primary | ICD-10-CM

## 2020-02-27 DIAGNOSIS — I10 BENIGN ESSENTIAL HTN: ICD-10-CM

## 2020-02-27 DIAGNOSIS — R73.9 HYPERGLYCEMIA: ICD-10-CM

## 2020-02-27 LAB
ALBUMIN SERPL-MCNC: 4.4 G/DL (ref 3.5–5.2)
ALBUMIN/GLOB SERPL: 1.6 G/DL
ALP SERPL-CCNC: 59 U/L (ref 39–117)
ALT SERPL-CCNC: 33 U/L (ref 1–41)
AST SERPL-CCNC: 17 U/L (ref 1–40)
BILIRUB SERPL-MCNC: 0.3 MG/DL (ref 0.2–1.2)
BUN SERPL-MCNC: 13 MG/DL (ref 6–20)
BUN/CREAT SERPL: 15.9 (ref 7–25)
CALCIUM SERPL-MCNC: 9.3 MG/DL (ref 8.6–10.5)
CHLORIDE SERPL-SCNC: 99 MMOL/L (ref 98–107)
CHOLEST SERPL-MCNC: 230 MG/DL (ref 0–200)
CO2 SERPL-SCNC: 26.7 MMOL/L (ref 22–29)
CREAT SERPL-MCNC: 0.82 MG/DL (ref 0.76–1.27)
GLOBULIN SER CALC-MCNC: 2.7 GM/DL
GLUCOSE SERPL-MCNC: 116 MG/DL (ref 65–99)
HBA1C MFR BLD: 4.9 % (ref 4.8–5.6)
HDLC SERPL-MCNC: 57 MG/DL (ref 40–60)
LDLC SERPL CALC-MCNC: 156 MG/DL (ref 0–100)
POTASSIUM SERPL-SCNC: 4.7 MMOL/L (ref 3.5–5.2)
PROT SERPL-MCNC: 7.1 G/DL (ref 6–8.5)
SODIUM SERPL-SCNC: 136 MMOL/L (ref 136–145)
TRIGL SERPL-MCNC: 86 MG/DL (ref 0–150)
VLDLC SERPL CALC-MCNC: 17.2 MG/DL

## 2020-02-27 PROCEDURE — 99214 OFFICE O/P EST MOD 30 MIN: CPT | Performed by: INTERNAL MEDICINE

## 2020-02-27 RX ORDER — FLUOCINONIDE TOPICAL SOLUTION USP, 0.05% 0.5 MG/ML
SOLUTION TOPICAL
COMMUNITY
Start: 2020-02-24

## 2020-02-27 RX ORDER — MELOXICAM 15 MG/1
15 TABLET ORAL DAILY
Qty: 90 TABLET | Refills: 0 | Status: SHIPPED | OUTPATIENT
Start: 2020-02-27 | End: 2020-08-20

## 2020-02-27 RX ORDER — DESONIDE 0.5 MG/G
CREAM TOPICAL
COMMUNITY
Start: 2019-12-19

## 2020-02-27 RX ORDER — BETAMETHASONE DIPROPIONATE 0.5 MG/ML
LOTION, AUGMENTED TOPICAL
COMMUNITY
Start: 2019-12-19

## 2020-02-27 RX ORDER — TOBRAMYCIN AND DEXAMETHASONE 3; 1 MG/ML; MG/ML
SUSPENSION/ DROPS OPHTHALMIC
COMMUNITY
Start: 2020-02-25 | End: 2021-02-08

## 2020-02-27 RX ORDER — LOSARTAN POTASSIUM 100 MG/1
100 TABLET ORAL DAILY
Qty: 90 TABLET | Refills: 3 | Status: SHIPPED | OUTPATIENT
Start: 2020-02-27 | End: 2021-03-09 | Stop reason: SDUPTHER

## 2020-02-27 RX ORDER — KETOCONAZOLE 20 MG/ML
SHAMPOO TOPICAL
COMMUNITY
Start: 2020-01-16 | End: 2022-12-27

## 2020-02-27 NOTE — PROGRESS NOTES
"  Antony Gimenez is a 59 y.o. male who presents with   Chief Complaint   Patient presents with   • Hypertension     Losartan 100 mg daily (he says he has been out of it for the past 4 days)   • Hyperlipidemia     No prescription medication   • Blood Sugar Problem     Blood sugar consistently around 112.  No prescription medication.  Current weight 188 pounds.  At his height of estimated 5 foot 11 his estimated goal weight is 185 pounds.   .    59-year-old male.  Checkup/lab update visit.  No medical complaints.  Has been out of his losartan for the past 4 days.  Not monitoring blood pressure with regularity    Hypertension   This is a chronic problem. The current episode started more than 1 year ago. The problem has been waxing and waning since onset. The problem is uncontrolled. Current antihypertension treatment includes angiotensin blockers. The current treatment provides no improvement. Compliance problems: As noted above.    Hyperlipidemia   This is a chronic problem. The current episode started more than 1 year ago. The problem is controlled. Recent lipid tests were reviewed and are normal. Exacerbating diseases include diabetes. He is currently on no antihyperlipidemic treatment.   Blood Sugar Problem   This is a chronic problem. The current episode started more than 1 year ago. The problem has been waxing and waning. He has tried nothing for the symptoms.        /100   Pulse 73   Temp 98.8 °F (37.1 °C)   Resp 13   Ht 180.3 cm (70.98\")   Wt 85.4 kg (188 lb 3.2 oz)   SpO2 98%   BMI 26.26 kg/m²     The following portions of the patient's history were reviewed and updated as appropriate: allergies, current medications, past medical history and problem list.    Review of Systems   Constitutional: Negative.    HENT: Negative.    Eyes: Negative.    Respiratory: Negative.    Cardiovascular: Negative.    Genitourinary: Negative.    Musculoskeletal: Negative.    Skin: Negative.    Neurological: " Negative.    Psychiatric/Behavioral: Negative.        Objective   Physical Exam   Constitutional: He is oriented to person, place, and time. He appears well-developed and well-nourished. No distress.   HENT:   Head: Normocephalic and atraumatic.   Eyes: Pupils are equal, round, and reactive to light. Conjunctivae and EOM are normal.   Neck: Normal range of motion. Neck supple. No thyromegaly present.   Neck exam negative.  Carotid auscultation normal-no bruits heard.   Cardiovascular: Normal rate, regular rhythm, normal heart sounds and intact distal pulses. Exam reveals no gallop and no friction rub.   No murmur heard.  Pulmonary/Chest: Effort normal and breath sounds normal. No respiratory distress. He has no wheezes. He has no rales. He exhibits no tenderness.   Genitourinary:   Genitourinary Comments: History of prostate cancer.  Genitourinary care/PSA testing via urology-Dr. Moncada   Neurological: He is alert and oriented to person, place, and time.   Psychiatric: He has a normal mood and affect. His behavior is normal. Judgment and thought content normal.   Nursing note and vitals reviewed.      Assessment/Plan   Antony was seen today for hypertension, hyperlipidemia and blood sugar problem.    Diagnoses and all orders for this visit:    Mixed hyperlipidemia  -     Comprehensive Metabolic Panel  -     Lipid Panel    Benign essential HTN  -     losartan (COZAAR) 100 MG tablet; Take 1 tablet by mouth Daily.  -     Comprehensive Metabolic Panel    Bilateral temporomandibular joint pain  -     meloxicam (MOBIC) 15 MG tablet; Take 1 tablet by mouth Daily.    Hyperglycemia  -     Comprehensive Metabolic Panel  -     Hemoglobin A1c      Plan: Labs as above for the issues as outlined.  Resume losartan 100 mg daily.    Blood pressure on today's visit elevated at 148/100 but he has been out of his medication for 4 days as noted above..  The patient was advised to resume losartan 100 mg daily as noted above and monitor  blood pressure twice daily at home trying to keep the blood pressure consistently at or below 130/80.  I have suggested a 3-week period of observation to assess consistency.  If blood pressure is consistently above this level I have suggested a follow-up visit in 3 weeks to reassess the blood pressure level.    Assuming blood pressure stabilizes consistently at or below 130/80 with the resumption of the losartan 100 mg daily we will plan on seeing him in approximately 6 months or as scheduling permits for an annual physical with lab updates then.    Flu shot advised-declined.    Colonoscopy update needed.  He said he would schedule this himself.

## 2020-02-28 NOTE — PROGRESS NOTES
"Current blood sugar is 116 which is at its highest point over the past 2 years.  A1c remains normal at 4.9.  These numbers would be compatible with a diagnosis of \"prediabetes\" as defined by the American diabetes Association.  Cholesterol currently 230 with the LDL being 156.  These 2 numbers are at their highest point over the past 2 years and at their current level assuming no changes are made your estimated 10-year risk of a major cardiovascular event is 10.4%.  It is recommended that prescription medication with statin therapy is used under the circumstances unless dietary changes can help these numbers to come down.  I would prefer the dietary route first.  Follow-up in 4 months rather than the usual 6 months is advised for retesting and office reevaluation to reassess these numbers.  If no significant changes have occurred then we may need to consider prescription medication for lipids and possibly even blood sugar."

## 2020-08-20 DIAGNOSIS — M26.623 BILATERAL TEMPOROMANDIBULAR JOINT PAIN: ICD-10-CM

## 2020-08-20 RX ORDER — MELOXICAM 15 MG/1
TABLET ORAL
Qty: 90 TABLET | Refills: 3 | Status: SHIPPED | OUTPATIENT
Start: 2020-08-20 | End: 2021-09-08 | Stop reason: SDUPTHER

## 2020-12-23 ENCOUNTER — TELEPHONE (OUTPATIENT)
Dept: INTERNAL MEDICINE | Age: 60
End: 2020-12-23

## 2021-02-08 ENCOUNTER — OFFICE VISIT (OUTPATIENT)
Dept: INTERNAL MEDICINE | Age: 61
End: 2021-02-08

## 2021-02-08 VITALS
OXYGEN SATURATION: 98 % | HEART RATE: 82 BPM | BODY MASS INDEX: 25.87 KG/M2 | DIASTOLIC BLOOD PRESSURE: 84 MMHG | HEIGHT: 71 IN | WEIGHT: 184.8 LBS | SYSTOLIC BLOOD PRESSURE: 142 MMHG | TEMPERATURE: 97.1 F

## 2021-02-08 DIAGNOSIS — I10 BENIGN ESSENTIAL HTN: ICD-10-CM

## 2021-02-08 DIAGNOSIS — E78.2 MIXED HYPERLIPIDEMIA: ICD-10-CM

## 2021-02-08 DIAGNOSIS — Z76.89 ENCOUNTER TO ESTABLISH CARE WITH NEW DOCTOR: Primary | ICD-10-CM

## 2021-02-08 PROBLEM — L40.9 PSORIASIS: Status: ACTIVE | Noted: 2021-02-08

## 2021-02-08 PROCEDURE — 99214 OFFICE O/P EST MOD 30 MIN: CPT | Performed by: NURSE PRACTITIONER

## 2021-02-08 RX ORDER — MULTIPLE VITAMINS W/ MINERALS TAB 9MG-400MCG
1 TAB ORAL DAILY
COMMUNITY

## 2021-02-08 RX ORDER — LANOLIN ALCOHOL/MO/W.PET/CERES
1000 CREAM (GRAM) TOPICAL DAILY
COMMUNITY

## 2021-02-08 RX ORDER — CHLORAL HYDRATE 500 MG
1200 CAPSULE ORAL
COMMUNITY

## 2021-02-08 NOTE — PROGRESS NOTES
"[facility] [place]  JO ANN Vides Gimenez / 60 y.o. / male  02/08/2021      VITAL SIGNS     Visit Vitals  /84   Pulse 82   Temp 97.1 °F (36.2 °C) (Temporal)   Ht 180.3 cm (70.98\")   Wt 83.8 kg (184 lb 12.8 oz)   SpO2 98%   BMI 25.79 kg/m²       BP Readings from Last 3 Encounters:   02/08/21 142/84   02/27/20 148/100   01/22/20 168/95     Wt Readings from Last 3 Encounters:   02/08/21 83.8 kg (184 lb 12.8 oz)   02/27/20 85.4 kg (188 lb 3.2 oz)   01/22/20 81.6 kg (180 lb)     Body mass index is 25.79 kg/m².      MEDICATIONS     Current Outpatient Medications   Medication Sig Dispense Refill   • betamethasone, augmented, (DIPROLENE) 0.05 % lotion PLEASE SEE ATTACHED FOR DETAILED DIRECTIONS     • losartan (COZAAR) 100 MG tablet Take 1 tablet by mouth Daily. 90 tablet 3   • meloxicam (MOBIC) 15 MG tablet TAKE 1 TABLET BY MOUTH EVERY DAY 90 tablet 3   • multivitamin with minerals tablet tablet Take 1 tablet by mouth Daily.     • Omega-3 Fatty Acids (fish oil) 1000 MG capsule capsule Take  by mouth Daily With Breakfast.     • vitamin B-12 (CYANOCOBALAMIN) 1000 MCG tablet Take 1,000 mcg by mouth Daily.     • Aspirin Buf,CaCarb-MgCarb-MgO, 81 MG tablet Take 81 mg by mouth Daily.     • desonide (DESOWEN) 0.05 % cream APPLY BY TOPICAL ROUTE 2 TIMES EVERY DAY TO THE AFFECTED AREAS OF EARS     • fluocinonide (LIDEX) 0.05 % external solution      • ketoconazole (NIZORAL) 2 % shampoo        No current facility-administered medications for this visit.        CHIEF COMPLAINT     Establish Care and Labs Only (fasting)      ASSESSMENT & PLAN     Problem List Items Addressed This Visit        Cardiac and Vasculature    Benign essential HTN    Relevant Medications    losartan (COZAAR) 100 MG tablet    HLD (hyperlipidemia)      Other Visit Diagnoses     Encounter to establish care with new doctor    -  Primary        No orders of the defined types were placed in this encounter.    No orders of the defined " types were placed in this encounter.      Summary/Discussion:  • Continue current medication regimen at this time.  We will plan for 6-month follow-up for annual physical and chronic medical problems with fasting labs 1 week prior.  • Patient instructed to keep blood pressure log and check list to 3 times weekly.  Minded to modify his diet and increase exercise.    Next Appointment with me: Visit date not found    Return in about 6 months (around 8/8/2021) for Next scheduled follow up, Annual physical, with fasting labs 1 week prior .    _____________________________________________________________________________________    HISTORY OF PRESENT ILLNESS     Patient presents to establish care with new provider in the office.  Previous patient of Dr. Lambert.  Previously followed for essential hypertension, mixed hyperlipidemia, and elevated fasting glucose.    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  He is applying for different positions in the company currently.  Blood pressure today in office is 142/84.  States he does periodically check his blood pressure at home and readings are in the 130s over 70s.  We discussed adding another blood pressure medication to his current regimen the patient politely declined this.  He agrees that he will check his blood pressure at least 2-3 times a week and call if elevated above 150/90. Denies any chest pain, shortness of air, headache, visual disturbances, lower extremity leg swelling, or heart palpitations.  February 27, 2020 CMP revealed normal kidney and electrolytes.    Next hyperlipidemia: No medication treatment at this time. Patient drinks around 3 alcoholic beverages per night.  Panel in February 2020 revealed total cholesterol of 230 and LDL of 156.  He states exercise and diet have declined over the pandemic.  He would like to modify his diet and exercise and recheck in 6 months.    Elevated fasting glucose: Although fasting glucose  has been elevated in the past last at 116, hemoglobin A1c has been within normal range at last 4.90.    Other history includes cervical radiculopathy which he is attended physical therapy with improvement.  Currently takes meloxicam 15 mg daily for pain.  This does moderately improve his symptoms.    Urology: Followed by Dr. Edmonds from prior prostate cancer in 2015.  Negative for prostatectomy.  Positive for radiation treatment.  He currently does have issues with urinating and erectile dysfunction.  PSA is also followed by urology.    He uses betamethasone, desonide, Lidex, ketoconazole with history of psoriasis on his scalp.  Controlled with these medications.     He takes over-the-counter supplements including multivitamin, vitamin B12, fish oil, biotin, and aspirin 81 mg tablet daily.    Patient Care Team:  Favian Sanders APRN as PCP - General (Internal Medicine)  Grey, Femi Her MD as Consulting Physician (Urology)      REVIEW OF SYSTEMS     Review of Systems  Constitutional neg except per HPI   Resp neg  CV neg  Musc: cervical discomfort     PHYSICAL EXAMINATION     Physical Exam  Constitutional  No distress  Cardiovascular Rate  normal . Rhythm: regular . Heart sounds:  normal  Pulmonary/Chest  Effort normal. Breath sounds:  normal  Psychiatric  Alert. Judgment and thought content normal. Mood normal     REVIEWED DATA     Labs:     Lab Results   Component Value Date     02/27/2020    K 4.7 02/27/2020    CALCIUM 9.3 02/27/2020    AST 17 02/27/2020    ALT 33 02/27/2020    BUN 13 02/27/2020    CREATININE 0.82 02/27/2020    CREATININE 0.85 10/15/2018    CREATININE 0.87 04/10/2018    EGFRIFNONA 97 02/27/2020    EGFRIFAFRI 117 02/27/2020       Lab Results   Component Value Date    HGBA1C 4.90 02/27/2020    HGBA1C 4.70 (L) 10/15/2018    HGBA1C 5.00 10/09/2017     (H) 02/27/2020     (H) 10/15/2018     (H) 04/10/2018       Lab Results   Component Value Date     (H) 02/27/2020      (H) 10/15/2018     (H) 04/10/2018    HDL 57 02/27/2020    HDL 64 (H) 10/15/2018    HDL 59 04/10/2018    TRIG 86 02/27/2020    TRIG 77 10/15/2018    TRIG 70 04/10/2018       No results found for: TSH, FREET4    Lab Results   Component Value Date    WBC 6.07 08/25/2014    HGB 16.6 08/25/2014     08/25/2014       No results found for: PROTEIN, GLUCOSEU, BLOODU, NITRITEU, LEUKOCYTESUR    Imaging:         Medical Tests:         Summary of old records / correspondence / consultant report:         Request outside records:         ALLERGIES  No Known Allergies     PFSH:     The following portions of the patient's history were reviewed and updated as appropriate: Allergies / Current Medications / Past Medical History / Surgical History / Social History / Family History    PROBLEM LIST   Patient Active Problem List   Diagnosis   • Adenocarcinoma of prostate (CMS/HCC)   • Benign essential HTN   • ED (erectile dysfunction)   • Degenerative joint disease involving multiple joints   • HLD (hyperlipidemia)   • Excessive urination at night   • Hyperglycemia   • Cervical disc disorder with radiculopathy of mid-cervical region   • Weakness of right arm   • Cervical disc disorder at C5-C6 level with radiculopathy   • Cervical disc disorder at C4-C5 level with radiculopathy   • Psoriasis       PAST MEDICAL HISTORY  Past Medical History:   Diagnosis Date   • High blood pressure    • Prostate cancer (CMS/HCC)        SURGICAL HISTORY  Past Surgical History:   Procedure Laterality Date   • COLONOSCOPY  07/2013    Dr Mary Rabago ( Fam. Hx of Polyps)   • FOOT SURGERY      9/2014 and 8/2010       SOCIAL HISTORY  Social History     Socioeconomic History   • Marital status:      Spouse name: Not on file   • Number of children: 2   • Years of education: COLLEGE    • Highest education level: Not on file   Occupational History   • Occupation: LEGAL    Tobacco Use   • Smoking status: Never Smoker   • Smokeless  tobacco: Never Used   Substance and Sexual Activity   • Alcohol use: Yes     Drinks per session: 3 or 4   • Drug use: No   • Sexual activity: Yes       FAMILY HISTORY  Family History   Problem Relation Age of Onset   • No Known Problems Mother    • Heart failure Father          Examiner was wearing KN95 mask, face shield and exam gloves during the entire duration of the visit. Patient was masked the entire time.   Minimum social distance of 6 ft maintained entire visit except if physical contact was necessary as documented.     **Dragon Disclaimer:   Much of this encounter note is an electronic transcription/translation of spoken language to printed text. The electronic translation of spoken language may permit erroneous, or at times, nonsensical words or phrases to be inadvertently transcribed. Although I have reviewed the note for such errors, some may still exist.

## 2021-03-09 DIAGNOSIS — I10 BENIGN ESSENTIAL HTN: ICD-10-CM

## 2021-03-09 RX ORDER — LOSARTAN POTASSIUM 100 MG/1
100 TABLET ORAL DAILY
Qty: 90 TABLET | Refills: 0 | Status: SHIPPED | OUTPATIENT
Start: 2021-03-09 | End: 2021-06-09

## 2021-03-26 ENCOUNTER — BULK ORDERING (OUTPATIENT)
Dept: CASE MANAGEMENT | Facility: OTHER | Age: 61
End: 2021-03-26

## 2021-03-26 DIAGNOSIS — Z23 IMMUNIZATION DUE: ICD-10-CM

## 2021-06-09 DIAGNOSIS — I10 BENIGN ESSENTIAL HTN: ICD-10-CM

## 2021-06-09 RX ORDER — LOSARTAN POTASSIUM 100 MG/1
TABLET ORAL
Qty: 90 TABLET | Refills: 3 | Status: SHIPPED | OUTPATIENT
Start: 2021-06-09 | End: 2022-06-09

## 2021-07-29 DIAGNOSIS — Z00.00 HEALTHCARE MAINTENANCE: Primary | ICD-10-CM

## 2021-08-09 ENCOUNTER — OFFICE VISIT (OUTPATIENT)
Dept: INTERNAL MEDICINE | Age: 61
End: 2021-08-09

## 2021-08-09 VITALS
OXYGEN SATURATION: 98 % | DIASTOLIC BLOOD PRESSURE: 80 MMHG | HEIGHT: 71 IN | HEART RATE: 76 BPM | WEIGHT: 191.8 LBS | TEMPERATURE: 98.4 F | BODY MASS INDEX: 26.85 KG/M2 | SYSTOLIC BLOOD PRESSURE: 146 MMHG

## 2021-08-09 DIAGNOSIS — E78.5 HYPERLIPIDEMIA, UNSPECIFIED HYPERLIPIDEMIA TYPE: ICD-10-CM

## 2021-08-09 DIAGNOSIS — Z00.00 ENCOUNTER FOR ANNUAL PHYSICAL EXAM: Primary | ICD-10-CM

## 2021-08-09 DIAGNOSIS — Z12.11 SCREENING FOR MALIGNANT NEOPLASM OF COLON: ICD-10-CM

## 2021-08-09 DIAGNOSIS — I10 ESSENTIAL HYPERTENSION: ICD-10-CM

## 2021-08-09 DIAGNOSIS — J45.990 EXERCISE-INDUCED ASTHMA: ICD-10-CM

## 2021-08-09 PROCEDURE — 99396 PREV VISIT EST AGE 40-64: CPT | Performed by: NURSE PRACTITIONER

## 2021-08-09 PROCEDURE — 99214 OFFICE O/P EST MOD 30 MIN: CPT | Performed by: NURSE PRACTITIONER

## 2021-08-09 RX ORDER — ALBUTEROL SULFATE 90 UG/1
2 AEROSOL, METERED RESPIRATORY (INHALATION) EVERY 4 HOURS PRN
Qty: 6.7 G | Refills: 5 | Status: SHIPPED | OUTPATIENT
Start: 2021-08-09

## 2021-08-09 RX ORDER — ALBUTEROL SULFATE 90 UG/1
2 AEROSOL, METERED RESPIRATORY (INHALATION) EVERY 4 HOURS PRN
Qty: 6.7 G | Refills: 5 | Status: SHIPPED | OUTPATIENT
Start: 2021-08-09 | End: 2021-08-09

## 2021-08-09 NOTE — PROGRESS NOTES
"[facility] [place]  JO ANN Vides Gimenez / 60 y.o. / male  08/09/2021                        VITALS     Visit Vitals  /80   Pulse 76   Temp 98.4 °F (36.9 °C) (Temporal)   Ht 180.3 cm (70.98\")   Wt 87 kg (191 lb 12.8 oz)   SpO2 98%   BMI 26.77 kg/m²       BP Readings from Last 3 Encounters:   08/09/21 146/80   02/08/21 142/84   02/27/20 148/100     Wt Readings from Last 3 Encounters:   08/09/21 87 kg (191 lb 12.8 oz)   02/08/21 83.8 kg (184 lb 12.8 oz)   02/27/20 85.4 kg (188 lb 3.2 oz)      Body mass index is 26.77 kg/m².    MEDICATIONS     Current Outpatient Medications   Medication Sig Dispense Refill   • ASPIRIN 81 PO Take 81 mg by mouth Daily.     • betamethasone, augmented, (DIPROLENE) 0.05 % lotion PLEASE SEE ATTACHED FOR DETAILED DIRECTIONS     • desonide (DESOWEN) 0.05 % cream APPLY BY TOPICAL ROUTE 2 TIMES EVERY DAY TO THE AFFECTED AREAS OF EARS     • fluocinonide (LIDEX) 0.05 % external solution      • losartan (COZAAR) 100 MG tablet TAKE 1 TABLET BY MOUTH EVERY DAY 90 tablet 3   • meloxicam (MOBIC) 15 MG tablet TAKE 1 TABLET BY MOUTH EVERY DAY 90 tablet 3   • multivitamin with minerals tablet tablet Take 1 tablet by mouth Daily.     • Omega-3 Fatty Acids (fish oil) 1000 MG capsule capsule Take  by mouth Daily With Breakfast.     • vitamin B-12 (CYANOCOBALAMIN) 1000 MCG tablet Take 1,000 mcg by mouth Daily.     • albuterol sulfate HFA (Proventil HFA) 108 (90 Base) MCG/ACT inhaler Inhale 2 puffs Every 4 (Four) Hours As Needed for Wheezing. 6.7 g 5   • ketoconazole (NIZORAL) 2 % shampoo        No current facility-administered medications for this visit.       _____________________________________________________________________________________    CHIEF COMPLAINT     Annual Exam, Hypertension, and Hyperlipidemia      HISTORY OF PRESENT ILLNESS      Antony presents for annual health maintenance visit.    · Last health maintenance visit: approximately 1 year ago  · General health: " good  · Lifestyle:  · Attempting to lose weight?: Yes   · Diet: eats moderately healthy  · Exercise: exercises nearly every day  · Tobacco: Never used   · Alcohol: regular (moderate)  · Work: Full-time  · Reproductive health:  · Sexually active?: Yes   · Concern for STD?: No   · Sexual problems?:erectile dysfunction   · Sees Urologist?: Yes hx prostate cancer   · Depression Screening:      PHQ-2/PHQ-9 Depression Screening 2/8/2021   Little interest or pleasure in doing things 0   Feeling down, depressed, or hopeless 0   Total Score 0         PHQ-2: 0 (Not depressed)     PHQ-9: 0 (Negative screening for depression)    Patient Care Team:  Favian Sanders APRN as PCP - General (Internal Medicine)  Femi Edmonds MD as Consulting Physician (Urology)  ______________________________________________________________________    ALLERGIES  No Known Allergies     PFSH:     The following portions of the patient's history were reviewed and updated as appropriate: Allergies / Current Medications / Past Medical History / Surgical History / Social History / Family History    PROBLEM LIST   Patient Active Problem List   Diagnosis   • Adenocarcinoma of prostate (CMS/HCC)   • Benign essential HTN   • ED (erectile dysfunction)   • Degenerative joint disease involving multiple joints   • HLD (hyperlipidemia)   • Excessive urination at night   • Hyperglycemia   • Cervical disc disorder with radiculopathy of mid-cervical region   • Weakness of right arm   • Cervical disc disorder at C5-C6 level with radiculopathy   • Cervical disc disorder at C4-C5 level with radiculopathy   • Psoriasis       PAST MEDICAL HISTORY  Past Medical History:   Diagnosis Date   • Arthritis    • High blood pressure    • Prostate cancer (CMS/HCC)        SURGICAL HISTORY  Past Surgical History:   Procedure Laterality Date   • COLONOSCOPY  07/2013    Dr Mary Rabago ( Fam. Hx of Polyps)   • FOOT SURGERY      9/2014 and 8/2010       SOCIAL HISTORY  Social  History     Socioeconomic History   • Marital status:      Spouse name: Not on file   • Number of children: 2   • Years of education: COLLEGE    • Highest education level: Not on file   Tobacco Use   • Smoking status: Never Smoker   • Smokeless tobacco: Never Used   Vaping Use   • Vaping Use: Never used   Substance and Sexual Activity   • Alcohol use: Yes   • Drug use: No   • Sexual activity: Yes       FAMILY HISTORY  Family History   Problem Relation Age of Onset   • No Known Problems Mother    • Heart failure Father        IMMUNIZATION HISTORY    There is no immunization history on file for this patient.    ______________________________________________________________________    REVIEW OF SYSTEMS     Review of Systems   Constitutional: Negative.    HENT: Negative.    Eyes: Negative.    Respiratory: Negative.    Cardiovascular: Negative.    Gastrointestinal: Negative.    Endocrine: Negative.    Genitourinary:        Erectile dysfunction; history prostatectomy    Musculoskeletal: Negative.    Skin: Negative.    Allergic/Immunologic: Negative.    Neurological: Negative.    Hematological: Negative.    Psychiatric/Behavioral: Negative.      PHYSICAL EXAMINATION     Physical Exam  Constitutional:       Appearance: Normal appearance.   HENT:      Head: Normocephalic and atraumatic.      Right Ear: Tympanic membrane normal.      Left Ear: Tympanic membrane normal.      Nose: Nose normal.      Mouth/Throat:      Mouth: Mucous membranes are moist.      Pharynx: Oropharynx is clear.   Eyes:      Conjunctiva/sclera: Conjunctivae normal.      Pupils: Pupils are equal, round, and reactive to light.   Neck:      Thyroid: No thyromegaly.      Vascular: No carotid bruit.   Cardiovascular:      Rate and Rhythm: Normal rate and regular rhythm.      Pulses: Normal pulses.      Heart sounds: Normal heart sounds.   Pulmonary:      Effort: Pulmonary effort is normal.      Breath sounds: Normal breath sounds.   Abdominal:       Palpations: Abdomen is soft.      Tenderness: There is no right CVA tenderness or left CVA tenderness.   Genitourinary:     Comments: Followed by urology   Musculoskeletal:         General: Normal range of motion.      Cervical back: Normal range of motion.      Right lower leg: No edema.      Left lower leg: No edema.   Lymphadenopathy:      Cervical: No cervical adenopathy.   Skin:     General: Skin is warm and dry.   Neurological:      Mental Status: He is alert and oriented to person, place, and time.      Deep Tendon Reflexes:      Reflex Scores:       Patellar reflexes are 2+ on the right side and 2+ on the left side.  Psychiatric:         Thought Content: Thought content normal.         Judgment: Judgment normal.        REVIEWED DATA      Labs:    Lab Results   Component Value Date     08/02/2021    K 4.3 08/02/2021    CALCIUM 9.5 08/02/2021    AST 23 08/02/2021    ALT 45 (H) 08/02/2021    BUN 11 08/02/2021    CREATININE 0.86 08/02/2021    CREATININE 0.82 02/27/2020    CREATININE 0.85 10/15/2018    EGFRIFNONA 91 08/02/2021    EGFRIFAFRI 111 08/02/2021       Lab Results   Component Value Date     (H) 08/02/2021    HGBA1C 4.90 08/02/2021    HGBA1C 4.90 02/27/2020    HGBA1C 4.70 (L) 10/15/2018    TSH 2.520 08/02/2021    FREET4 1.11 08/02/2021       Lab Results   Component Value Date    PSA 0.413 08/02/2021    PSA 4.48 (H) 05/11/2015       Lab Results   Component Value Date     (H) 08/02/2021    HDL 54 08/02/2021    TRIG 152 (H) 08/02/2021    CHOLHDLRATIO 4.31 08/02/2021       No components found for: [id number]    Lab Results   Component Value Date    WBC 4.59 08/02/2021    HGB 15.8 08/02/2021    MCV 93.3 08/02/2021     08/02/2021       Lab Results   Component Value Date    PROTEIN Negative 08/02/2021    GLUCOSEU Negative 08/02/2021    BLOODU Negative 08/02/2021    NITRITEU Negative 08/02/2021    LEUKOCYTESUR Negative 08/02/2021        No results found for: HEPCVIRUSABY    Imaging:            Medical Tests:       ______________________________________________________________________    ASSESSMENT & PLAN     ANNUAL WELLNESS EXAM / PHYSICAL     Other medical problems addressed today:  Patient presents for 6 month follow-up on essential hypertension, mixed hyperlipidemia, and elevated fasting glucose.     Hypertension: Checking blood pressure regular at home, averaging in 130s/80s. Denies any chest pain, shortness of air, headache, visual disturbances, lower extremity leg swelling, or heart palpitations.  August 2021 CMP revealed normal kidney and electrolytes.     Hyperlipidemia: Current ASCVD risk score of 11%.  Warrants moderate to high intensity statin.  Plans to increase exercise and decrease alcohol use.  If still elevated in 6 months, patient is acceptable to statin.    Elevated fasting glucose: Although fasting glucose has been elevated in the past last at 116, hemoglobin A1c has been within normal range at last 4.90.     Urology: Followed by Dr. Edmonds from prior prostate cancer in 2015.  Negative for prostatectomy. Positive for radiation treatment.  He currently does have issues with urinating and erectile dysfunction.  PSA and exam is also followed by urology.    History of exercise-induced asthma.  Patient has recently had to use his Proventil more recently with current pollen and allergies.  Needs refill.     Summary/Discussion:     · Continue current losartan 100 mg for blood pressure.  If consistently in the 140s over 90s patient is to call the office and we will add additional medication regimen.  · Hyperlipidemia: Continue omega-3 fish oil.  Recheck lipid panel in 6 months to determine statin.  · Continue management by specialist for dermatology and urology.  · Referral for colonoscopy.  · Refill inhaler.  · Declines all vaccinations at this time.  · Follow-up in 6 months for chronic medical management, earlier if needed.    Next Appointment with me: Visit date not found    Return  in about 6 months (around 2/9/2022) for Next scheduled follow up.      HEALTHCARE MAINTENANCE ISSUES       Cancer Screening:  · Colon: Initial/Next screening at age: DUE NOW  · Repeat colon cancer screening: every 5-7 years  · Prostate: Sees/will see a urologist for screening.  · Testicular: Recommended monthly self exam  · Skin: Monthly self skin examination, annual exam by health professional  · Lung: Does not meet criteria for lung cancer screening.   · Other:    Screening Labs & Tests:  · Lab results reviewed & discussed with with patient or orders placed today.  · EKG:  · CV Screening: Lipid panel  · DEXA (75+ or risk factors):   · HEP C (If born [id number] or risk factors): Negative screen  · Other:     Immunization/Vaccinations (to be given today unless deferred by patient)  · Influenza: Patient deferred/declined flu vaccine (recommended annual vaccination)  · Hepatitis A: Declined by patient  · Tetanus/Pertussis: Declined by patient  · Pneumovax: Not needed at this time  · Shingles: Patient declines vaccine  · Other:     Lifestyle Counseling:  · Lifestyle Modifications: Follow a low fat, low cholesterol diet  · Safety Issues: Always wear seatbelt, Avoid texting while driving   · Use sunscreen, regular skin examination  · Recommended annual dental/vision examination.  · Emotional/Stress/Sleep: Reviewed and  given when appropriate      Health Maintenance   Topic Date Due   • TDAP/TD VACCINES (1 - Tdap) 08/09/2021 (Originally 11/28/1979)   • ZOSTER VACCINE (1 of 2) 08/09/2021 (Originally 11/28/2012)   • COVID-19 Vaccine (1) 08/11/2021 (Originally 11/28/1972)   • INFLUENZA VACCINE  10/01/2021   • LIPID PANEL  08/02/2022   • ANNUAL PHYSICAL  08/10/2022   • COLORECTAL CANCER SCREENING  02/27/2030   • HEPATITIS C SCREENING  Addressed   • Pneumococcal Vaccine 0-64  Aged Out         Examiner was wearing KN95 mask, face shield and exam gloves during the entire duration of the visit. Patient was masked the entire  time.   Minimum social distance of 6 ft maintained entire visit except if physical contact was necessary as documented.     **Олег Disclaimer:   Much of this encounter note is an electronic transcription/translation of spoken language to printed text. The electronic translation of spoken language may permit erroneous, or at times, nonsensical words or phrases to be inadvertently transcribed. Although I have reviewed the note for such errors, some may still exist.

## 2021-09-02 ENCOUNTER — PREP FOR SURGERY (OUTPATIENT)
Dept: OTHER | Facility: HOSPITAL | Age: 61
End: 2021-09-02

## 2021-09-02 DIAGNOSIS — Z83.71 FAMILY HISTORY OF COLONIC POLYPS: Primary | ICD-10-CM

## 2021-09-07 PROBLEM — Z83.719 FAMILY HISTORY OF COLONIC POLYPS: Status: ACTIVE | Noted: 2021-09-07

## 2021-09-07 PROBLEM — Z83.71 FAMILY HISTORY OF COLONIC POLYPS: Status: ACTIVE | Noted: 2021-09-07

## 2021-09-08 DIAGNOSIS — M26.623 BILATERAL TEMPOROMANDIBULAR JOINT PAIN: ICD-10-CM

## 2021-09-08 RX ORDER — MELOXICAM 15 MG/1
15 TABLET ORAL DAILY
Qty: 90 TABLET | Refills: 1 | Status: SHIPPED | OUTPATIENT
Start: 2021-09-08 | End: 2022-03-03

## 2021-12-15 ENCOUNTER — ANESTHESIA (OUTPATIENT)
Dept: GASTROENTEROLOGY | Facility: HOSPITAL | Age: 61
End: 2021-12-15

## 2021-12-15 ENCOUNTER — ANESTHESIA EVENT (OUTPATIENT)
Dept: GASTROENTEROLOGY | Facility: HOSPITAL | Age: 61
End: 2021-12-15

## 2021-12-15 ENCOUNTER — HOSPITAL ENCOUNTER (OUTPATIENT)
Facility: HOSPITAL | Age: 61
Setting detail: HOSPITAL OUTPATIENT SURGERY
Discharge: HOME | End: 2021-12-15
Attending: COLON & RECTAL SURGERY | Admitting: COLON & RECTAL SURGERY

## 2021-12-15 VITALS
BODY MASS INDEX: 28.38 KG/M2 | RESPIRATION RATE: 18 BRPM | DIASTOLIC BLOOD PRESSURE: 83 MMHG | SYSTOLIC BLOOD PRESSURE: 135 MMHG | HEIGHT: 69 IN | OXYGEN SATURATION: 99 % | HEART RATE: 65 BPM | WEIGHT: 191.6 LBS | TEMPERATURE: 97.3 F

## 2021-12-15 PROCEDURE — 25010000002 PROPOFOL 10 MG/ML EMULSION: Performed by: ANESTHESIOLOGY

## 2021-12-15 RX ORDER — PROPOFOL 10 MG/ML
VIAL (ML) INTRAVENOUS AS NEEDED
Status: DISCONTINUED | OUTPATIENT
Start: 2021-12-15 | End: 2021-12-15 | Stop reason: SURG

## 2021-12-15 RX ORDER — PROPOFOL 10 MG/ML
VIAL (ML) INTRAVENOUS CONTINUOUS PRN
Status: DISCONTINUED | OUTPATIENT
Start: 2021-12-15 | End: 2021-12-15 | Stop reason: SURG

## 2021-12-15 RX ORDER — LIDOCAINE HYDROCHLORIDE 20 MG/ML
INJECTION, SOLUTION INFILTRATION; PERINEURAL AS NEEDED
Status: DISCONTINUED | OUTPATIENT
Start: 2021-12-15 | End: 2021-12-15 | Stop reason: SURG

## 2021-12-15 RX ORDER — SODIUM CHLORIDE, SODIUM LACTATE, POTASSIUM CHLORIDE, CALCIUM CHLORIDE 600; 310; 30; 20 MG/100ML; MG/100ML; MG/100ML; MG/100ML
30 INJECTION, SOLUTION INTRAVENOUS CONTINUOUS PRN
Status: DISCONTINUED | OUTPATIENT
Start: 2021-12-15 | End: 2021-12-15 | Stop reason: HOSPADM

## 2021-12-15 RX ADMIN — Medication 100 MCG/KG/MIN: at 11:23

## 2021-12-15 RX ADMIN — LIDOCAINE HYDROCHLORIDE 100 MG: 20 INJECTION, SOLUTION INFILTRATION; PERINEURAL at 11:21

## 2021-12-15 RX ADMIN — PROPOFOL 100 MG: 10 INJECTION, EMULSION INTRAVENOUS at 11:22

## 2021-12-15 RX ADMIN — SODIUM CHLORIDE, POTASSIUM CHLORIDE, SODIUM LACTATE AND CALCIUM CHLORIDE 30 ML/HR: 600; 310; 30; 20 INJECTION, SOLUTION INTRAVENOUS at 10:44

## 2021-12-15 NOTE — H&P
Antony Gimenez is a 61 y.o. male  who is referred by Cosmo Storey MD for a colonoscopy. He   has an indications: family history of colon polyps.     He denies any change in bowel function, melena, or hematochezia.    Past Medical History:   Diagnosis Date   • Arthritis    • High blood pressure    • Prostate cancer (HCC)        Past Surgical History:   Procedure Laterality Date   • COLONOSCOPY  07/2013    Dr Mary Rabago ( Fam. Hx of Polyps)   • FOOT SURGERY      9/2014 and 8/2010       Medications Prior to Admission   Medication Sig Dispense Refill Last Dose   • fluocinonide (LIDEX) 0.05 % external solution    12/15/2021 at Unknown time   • ketoconazole (NIZORAL) 2 % shampoo    Past Week at Unknown time   • losartan (COZAAR) 100 MG tablet TAKE 1 TABLET BY MOUTH EVERY DAY 90 tablet 3 12/14/2021 at Unknown time   • meloxicam (MOBIC) 15 MG tablet Take 1 tablet by mouth Daily. 90 tablet 1 12/15/2021 at Unknown time   • multivitamin with minerals tablet tablet Take 1 tablet by mouth Daily.   12/14/2021 at Unknown time   • Omega-3 Fatty Acids (fish oil) 1000 MG capsule capsule Take  by mouth Daily With Breakfast.   12/14/2021 at Unknown time   • vitamin B-12 (CYANOCOBALAMIN) 1000 MCG tablet Take 1,000 mcg by mouth Daily.   12/14/2021 at Unknown time   • albuterol sulfate HFA (Proventil HFA) 108 (90 Base) MCG/ACT inhaler Inhale 2 puffs Every 4 (Four) Hours As Needed for Wheezing. 6.7 g 5 More than a month at Unknown time   • ASPIRIN 81 PO Take 81 mg by mouth Daily.   12/8/2021   • betamethasone, augmented, (DIPROLENE) 0.05 % lotion PLEASE SEE ATTACHED FOR DETAILED DIRECTIONS      • desonide (DESOWEN) 0.05 % cream APPLY BY TOPICAL ROUTE 2 TIMES EVERY DAY TO THE AFFECTED AREAS OF EARS   More than a month at Unknown time       No Known Allergies    Family History   Problem Relation Age of Onset   • No Known Problems Mother    • Heart failure Father        Social History     Socioeconomic History   • Marital status:     • Number of children: 2   • Years of education: COLLEGE    Tobacco Use   • Smoking status: Never Smoker   • Smokeless tobacco: Never Used   Vaping Use   • Vaping Use: Never used   Substance and Sexual Activity   • Alcohol use: Yes   • Drug use: No   • Sexual activity: Yes       Review of Systems   Gastrointestinal: Negative for abdominal pain, nausea and vomiting.   All other systems reviewed and are negative.      Vitals:    12/15/21 1035   BP: 154/94   Pulse: 75   Resp: 16   Temp: 97.3 °F (36.3 °C)   SpO2: 98%         Physical Exam  Constitutional:       Appearance: He is well-developed.   HENT:      Head: Normocephalic and atraumatic.   Eyes:      Pupils: Pupils are equal, round, and reactive to light.   Cardiovascular:      Rate and Rhythm: Regular rhythm.   Pulmonary:      Effort: Pulmonary effort is normal.   Abdominal:      General: There is no distension.      Palpations: Abdomen is soft.   Musculoskeletal:         General: Normal range of motion.   Skin:     General: Skin is warm and dry.   Neurological:      Mental Status: He is alert and oriented to person, place, and time.   Psychiatric:         Thought Content: Thought content normal.         Judgment: Judgment normal.           Assessment/Plan      indications: family history of colon polyps         I recommend colonoscopy.  I described risks, benefits of the procedure with the patient including but not limited to bleeding, infection, possibility of perforation and possible polypectomy. All of the patient's questions were answered and they would like to proceed with the above recommendations.

## 2021-12-15 NOTE — ANESTHESIA POSTPROCEDURE EVALUATION
"Patient: Antony Gimenez    Procedure Summary     Date: 12/15/21 Room / Location:  [facility]    Anesthesia Start: 1119 Anesthesia Stop: 1141    Procedure: COLONOSCOPY to cecum (N/A ) Diagnosis:       Family history of colonic polyps      (Family history of colonic polyps [Z83.71])    Surgeons: Cosmo Storey MD Provider: Manish Cohen MD    Anesthesia Type: MAC ASA Status: 2          Anesthesia Type: MAC    Vitals  No vitals data found for the desired time range.          Post Anesthesia Care and Evaluation    Patient location during evaluation: bedside  Patient participation: complete - patient participated  Level of consciousness: sleepy but conscious  Pain score: 0  Pain management: adequate  Airway patency: patent  Anesthetic complications: No anesthetic complications    Cardiovascular status: acceptable  Respiratory status: acceptable  Hydration status: acceptable    Comments: /94 (BP Location: Left arm, Patient Position: Lying)   Pulse 75   Temp 36.3 °C (97.3 °F) (Oral)   Resp 16   Ht 175.3 cm (69\")   Wt 86.9 kg (191 lb 9.6 oz)   SpO2 98%   BMI 28.29 kg/m²         "

## 2021-12-15 NOTE — ANESTHESIA PREPROCEDURE EVALUATION
Anesthesia Evaluation     Patient summary reviewed and Nursing notes reviewed   NPO Solid Status: > 8 hours  NPO Liquid Status: > 4 hours           Airway   Mallampati: II  Neck ROM: full  No difficulty expected  Dental - normal exam     Pulmonary     breath sounds clear to auscultation  Cardiovascular     Rhythm: regular    (+) hypertension, hyperlipidemia,       Neuro/Psych  (+) numbness,     GI/Hepatic/Renal/Endo      Musculoskeletal     Abdominal    Substance History      OB/GYN          Other   arthritis,    history of cancer                    Anesthesia Plan    ASA 2     MAC     intravenous induction     Anesthetic plan, all risks, benefits, and alternatives have been provided, discussed and informed consent has been obtained with: patient.

## 2022-02-09 ENCOUNTER — OFFICE VISIT (OUTPATIENT)
Dept: INTERNAL MEDICINE | Age: 62
End: 2022-02-09

## 2022-02-09 VITALS
TEMPERATURE: 97.3 F | BODY MASS INDEX: 29.21 KG/M2 | HEART RATE: 67 BPM | HEIGHT: 69 IN | DIASTOLIC BLOOD PRESSURE: 92 MMHG | SYSTOLIC BLOOD PRESSURE: 140 MMHG | WEIGHT: 197.2 LBS | OXYGEN SATURATION: 99 %

## 2022-02-09 DIAGNOSIS — E78.2 MIXED HYPERLIPIDEMIA: Primary | ICD-10-CM

## 2022-02-09 DIAGNOSIS — I10 BENIGN ESSENTIAL HTN: ICD-10-CM

## 2022-02-09 PROCEDURE — 99214 OFFICE O/P EST MOD 30 MIN: CPT | Performed by: NURSE PRACTITIONER

## 2022-02-09 RX ORDER — AMLODIPINE BESYLATE 2.5 MG/1
2.5 TABLET ORAL DAILY
Qty: 90 TABLET | Refills: 3 | Status: SHIPPED | OUTPATIENT
Start: 2022-02-09 | End: 2023-02-01

## 2022-02-09 NOTE — PROGRESS NOTES
"    I N T E R N A L  M E D I C I N E  CHANTEL MONGE, APRN      ENCOUNTER DATE:  02/09/2022    Antony LAGOS Gimenez / 61 y.o. / male      CHIEF COMPLAINT / REASON FOR OFFICE VISIT     Hypertension and Hyperlipidemia      ASSESSMENT & PLAN     1. Mixed hyperlipidemia  - Decrease/eliminate soda, caffeine, alcohol and overall caloric intake. Reduce carbohydrates and sweets in diet.  Continue to improve dietary habits with lean proteins, fresh vegetables, fruits, and nuts. Improve aerobic exercise: walking/biking/swimming daily as tolerated, recommend 30 minutes/day at least 5 days/week.  - Hesitant to statin   - Lipid Panel With / Chol / HDL Ratio    2. Benign essential HTN  - Continue losartan 100mg daily   - Start amlodipine 2.5mg   - Check BP daily, call if above 140/90  - Comprehensive Metabolic Panel    Orders Placed This Encounter   Procedures   • Comprehensive Metabolic Panel   • Lipid Panel With / Chol / HDL Ratio     New Medications Ordered This Visit   Medications   • amLODIPine (NORVASC) 2.5 MG tablet     Sig: Take 1 tablet by mouth Daily.     Dispense:  90 tablet     Refill:  3       SUMMARY/DISCUSSION  • 6-month annual physical with fasting labs, chronic medical    Next Appointment with me: Visit date not found    Return in about 6 months (around 8/9/2022) for Annual physical.      VITAL SIGNS     Visit Vitals  /92 (Cuff Size: Adult)   Pulse 67   Temp 97.3 °F (36.3 °C) (Temporal)   Ht 175.3 cm (69\")   Wt 89.4 kg (197 lb 3.2 oz)   SpO2 99%   BMI 29.12 kg/m²     Wt Readings from Last 3 Encounters:   02/09/22 89.4 kg (197 lb 3.2 oz)   12/15/21 86.9 kg (191 lb 9.6 oz)   08/09/21 87 kg (191 lb 12.8 oz)     Body mass index is 29.12 kg/m².      MEDICATIONS AT THE TIME OF OFFICE VISIT     Current Outpatient Medications on File Prior to Visit   Medication Sig   • albuterol sulfate HFA (Proventil HFA) 108 (90 Base) MCG/ACT inhaler Inhale 2 puffs Every 4 (Four) Hours As Needed for Wheezing.   • ASPIRIN 81 PO Take 81 " mg by mouth Daily.   • betamethasone, augmented, (DIPROLENE) 0.05 % lotion PLEASE SEE ATTACHED FOR DETAILED DIRECTIONS   • desonide (DESOWEN) 0.05 % cream APPLY BY TOPICAL ROUTE 2 TIMES EVERY DAY TO THE AFFECTED AREAS OF EARS   • fluocinonide (LIDEX) 0.05 % external solution    • ketoconazole (NIZORAL) 2 % shampoo    • losartan (COZAAR) 100 MG tablet TAKE 1 TABLET BY MOUTH EVERY DAY   • meloxicam (MOBIC) 15 MG tablet Take 1 tablet by mouth Daily.   • multivitamin with minerals tablet tablet Take 1 tablet by mouth Daily.   • Omega-3 Fatty Acids (fish oil) 1000 MG capsule capsule Take 1,200 mg by mouth Daily With Breakfast.   • vitamin B-12 (CYANOCOBALAMIN) 1000 MCG tablet Take 1,000 mcg by mouth Daily.     No current facility-administered medications on file prior to visit.         HISTORY OF PRESENT ILLNESS     6-month follow-up for hypertension hyperlipidemia.     Hypertension: Checking blood pressure regular at home, averaging high 130s over 80s to occasional 140 over 90s.. Denies any chest pain, shortness of air, headache, visual disturbances, lower extremity leg swelling, or heart palpitations.      Hyperlipidemia: Current ASCVD risk score of 11%.  Warrants moderate to high intensity statin.  Plans to increase exercise and decrease alcohol use.   Hesitant to statin.     REVIEW OF SYSTEMS     Constitutional neg except per HPI   Resp neg  CV neg    PHYSICAL EXAMINATION     Physical Exam  Constitutional  No distress  Cardiovascular Rate  normal . Rhythm: regular . Heart sounds:  normal  Pulmonary/Chest  Effort normal. Breath sounds:  normal  Psychiatric  Alert. Judgment and thought content normal. Mood normal     REVIEWED DATA     Labs:   Lab Results   Component Value Date    GLUCOSE 109 (H) 08/02/2021    BUN 11 08/02/2021    CREATININE 0.86 08/02/2021    EGFRIFNONA 91 08/02/2021    EGFRIFAFRI 111 08/02/2021    BCR 12.8 08/02/2021    K 4.3 08/02/2021    CO2 26.6 08/02/2021    CALCIUM 9.5 08/02/2021    PROTENTOTREF  7.1 08/02/2021    ALBUMIN 4.40 08/02/2021    LABIL2 1.6 08/02/2021    AST 23 08/02/2021    ALT 45 (H) 08/02/2021     Lab Results   Component Value Date    CHLPL 233 (H) 08/02/2021    TRIG 152 (H) 08/02/2021    HDL 54 08/02/2021     (H) 08/02/2021     Lab Results   Component Value Date    HGBA1C 4.90 08/02/2021         Imaging:           Medical Tests:             Summary of old records / correspondence / consultant report:           Request outside records:           *Examiner was wearing medical surgical mask, face shield and exam gloves during the entire duration of the visit. Patient was masked the entire time.   Minimum social distance of 6 ft maintained entire visit except if physical contact was necessary as documented.     Dictated utilizing Dragon dictation

## 2022-02-10 LAB
ALBUMIN SERPL-MCNC: 4.3 G/DL (ref 3.8–4.9)
ALBUMIN/GLOB SERPL: 1.6 {RATIO} (ref 1.2–2.2)
ALP SERPL-CCNC: 70 IU/L (ref 44–121)
ALT SERPL-CCNC: 57 IU/L (ref 0–44)
AST SERPL-CCNC: 31 IU/L (ref 0–40)
BILIRUB SERPL-MCNC: 0.5 MG/DL (ref 0–1.2)
BUN SERPL-MCNC: 12 MG/DL (ref 6–24)
BUN/CREAT SERPL: 14 (ref 9–20)
CALCIUM SERPL-MCNC: 9.4 MG/DL (ref 8.7–10.2)
CHLORIDE SERPL-SCNC: 100 MMOL/L (ref 96–106)
CHOLEST SERPL-MCNC: 230 MG/DL (ref 100–199)
CHOLEST/HDLC SERPL: 4 RATIO (ref 0–5)
CO2 SERPL-SCNC: 22 MMOL/L (ref 20–29)
CREAT SERPL-MCNC: 0.87 MG/DL (ref 0.76–1.27)
GLOBULIN SER CALC-MCNC: 2.7 G/DL (ref 1.5–4.5)
GLUCOSE SERPL-MCNC: 116 MG/DL (ref 65–99)
HDLC SERPL-MCNC: 58 MG/DL
LDLC SERPL CALC-MCNC: 154 MG/DL (ref 0–99)
POTASSIUM SERPL-SCNC: 4.4 MMOL/L (ref 3.5–5.2)
PROT SERPL-MCNC: 7 G/DL (ref 6–8.5)
SODIUM SERPL-SCNC: 136 MMOL/L (ref 134–144)
TRIGL SERPL-MCNC: 102 MG/DL (ref 0–149)
VLDLC SERPL CALC-MCNC: 18 MG/DL (ref 5–40)

## 2022-02-11 DIAGNOSIS — E78.5 HYPERLIPIDEMIA, UNSPECIFIED HYPERLIPIDEMIA TYPE: Primary | ICD-10-CM

## 2022-02-11 RX ORDER — ROSUVASTATIN CALCIUM 5 MG/1
5 TABLET, COATED ORAL DAILY
Qty: 90 TABLET | Refills: 1 | Status: SHIPPED | OUTPATIENT
Start: 2022-02-11 | End: 2022-09-20

## 2022-03-03 DIAGNOSIS — M26.623 BILATERAL TEMPOROMANDIBULAR JOINT PAIN: ICD-10-CM

## 2022-03-03 RX ORDER — MELOXICAM 15 MG/1
TABLET ORAL
Qty: 90 TABLET | Refills: 1 | Status: SHIPPED | OUTPATIENT
Start: 2022-03-03 | End: 2022-09-07

## 2022-03-24 DIAGNOSIS — E78.5 HYPERLIPIDEMIA, UNSPECIFIED HYPERLIPIDEMIA TYPE: ICD-10-CM

## 2022-03-26 LAB
ALBUMIN SERPL-MCNC: 4.3 G/DL (ref 3.8–4.9)
ALBUMIN/GLOB SERPL: 1.6 {RATIO} (ref 1.2–2.2)
ALP SERPL-CCNC: 69 IU/L (ref 44–121)
ALT SERPL-CCNC: 65 IU/L (ref 0–44)
AST SERPL-CCNC: 30 IU/L (ref 0–40)
BILIRUB SERPL-MCNC: 0.4 MG/DL (ref 0–1.2)
BUN SERPL-MCNC: 13 MG/DL (ref 6–24)
BUN/CREAT SERPL: 15 (ref 9–20)
CALCIUM SERPL-MCNC: 9.3 MG/DL (ref 8.7–10.2)
CHLORIDE SERPL-SCNC: 99 MMOL/L (ref 96–106)
CHOLEST SERPL-MCNC: 179 MG/DL (ref 100–199)
CHOLEST/HDLC SERPL: 3.2 RATIO (ref 0–5)
CO2 SERPL-SCNC: 25 MMOL/L (ref 20–29)
CREAT SERPL-MCNC: 0.85 MG/DL (ref 0.76–1.27)
EGFRCR SERPLBLD CKD-EPI 2021: 100 ML/MIN/1.73
GLOBULIN SER CALC-MCNC: 2.7 G/DL (ref 1.5–4.5)
GLUCOSE SERPL-MCNC: 110 MG/DL (ref 65–99)
HDLC SERPL-MCNC: 56 MG/DL
LDLC SERPL CALC-MCNC: 98 MG/DL (ref 0–99)
POTASSIUM SERPL-SCNC: 4.1 MMOL/L (ref 3.5–5.2)
PROT SERPL-MCNC: 7 G/DL (ref 6–8.5)
SODIUM SERPL-SCNC: 137 MMOL/L (ref 134–144)
TRIGL SERPL-MCNC: 141 MG/DL (ref 0–149)
VLDLC SERPL CALC-MCNC: 25 MG/DL (ref 5–40)

## 2022-03-30 DIAGNOSIS — E78.5 HYPERLIPIDEMIA, UNSPECIFIED HYPERLIPIDEMIA TYPE: Primary | ICD-10-CM

## 2022-06-09 DIAGNOSIS — I10 BENIGN ESSENTIAL HTN: ICD-10-CM

## 2022-06-09 RX ORDER — LOSARTAN POTASSIUM 100 MG/1
TABLET ORAL
Qty: 90 TABLET | Refills: 3 | Status: SHIPPED | OUTPATIENT
Start: 2022-06-09 | End: 2022-08-04 | Stop reason: SDUPTHER

## 2022-08-04 DIAGNOSIS — I10 BENIGN ESSENTIAL HTN: ICD-10-CM

## 2022-08-04 RX ORDER — LOSARTAN POTASSIUM 100 MG/1
100 TABLET ORAL DAILY
Qty: 90 TABLET | Refills: 3 | Status: SHIPPED | OUTPATIENT
Start: 2022-08-04

## 2022-09-01 DIAGNOSIS — Z00.00 HEALTHCARE MAINTENANCE: Primary | ICD-10-CM

## 2022-09-07 DIAGNOSIS — M26.623 BILATERAL TEMPOROMANDIBULAR JOINT PAIN: ICD-10-CM

## 2022-09-07 RX ORDER — MELOXICAM 15 MG/1
TABLET ORAL
Qty: 90 TABLET | Refills: 1 | Status: SHIPPED | OUTPATIENT
Start: 2022-09-07

## 2022-09-15 ENCOUNTER — OFFICE VISIT (OUTPATIENT)
Dept: INTERNAL MEDICINE | Age: 62
End: 2022-09-15

## 2022-09-15 VITALS
WEIGHT: 196 LBS | BODY MASS INDEX: 29.03 KG/M2 | OXYGEN SATURATION: 99 % | SYSTOLIC BLOOD PRESSURE: 132 MMHG | HEIGHT: 69 IN | HEART RATE: 79 BPM | TEMPERATURE: 97.3 F | DIASTOLIC BLOOD PRESSURE: 74 MMHG

## 2022-09-15 DIAGNOSIS — E78.2 MIXED HYPERLIPIDEMIA: ICD-10-CM

## 2022-09-15 DIAGNOSIS — E78.5 HYPERLIPIDEMIA, UNSPECIFIED HYPERLIPIDEMIA TYPE: Primary | ICD-10-CM

## 2022-09-15 DIAGNOSIS — I10 BENIGN ESSENTIAL HTN: ICD-10-CM

## 2022-09-15 DIAGNOSIS — Z00.00 ENCOUNTER FOR ANNUAL PHYSICAL EXAM: Primary | ICD-10-CM

## 2022-09-15 PROCEDURE — 99396 PREV VISIT EST AGE 40-64: CPT | Performed by: NURSE PRACTITIONER

## 2022-09-15 RX ORDER — NEOMYCIN SULFATE, POLYMYXIN B SULFATE AND DEXAMETHASONE 3.5; 10000; 1 MG/ML; [USP'U]/ML; MG/ML
SUSPENSION/ DROPS OPHTHALMIC
COMMUNITY
Start: 2022-07-01 | End: 2022-12-27

## 2022-09-15 NOTE — PROGRESS NOTES
"[facility] [place] [facility]  JO ANN Vides Gimenez / 61 y.o. / male  09/15/2022                        VITALS     Visit Vitals  /74   Pulse 79   Temp 97.3 °F (36.3 °C)   Ht 175.3 cm (69\")   Wt 88.9 kg (196 lb)   SpO2 99%   BMI 28.94 kg/m²       BP Readings from Last 3 Encounters:   09/15/22 132/74   02/09/22 140/92   12/15/21 135/83     Wt Readings from Last 3 Encounters:   09/15/22 88.9 kg (196 lb)   02/09/22 89.4 kg (197 lb 3.2 oz)   12/15/21 86.9 kg (191 lb 9.6 oz)      Body mass index is 28.94 kg/m².    MEDICATIONS     Current Outpatient Medications   Medication Sig Dispense Refill   • albuterol sulfate HFA (Proventil HFA) 108 (90 Base) MCG/ACT inhaler Inhale 2 puffs Every 4 (Four) Hours As Needed for Wheezing. 6.7 g 5   • amLODIPine (NORVASC) 2.5 MG tablet Take 1 tablet by mouth Daily. 90 tablet 3   • ASPIRIN 81 PO Take 81 mg by mouth Daily.     • betamethasone, augmented, (DIPROLENE) 0.05 % lotion PLEASE SEE ATTACHED FOR DETAILED DIRECTIONS     • desonide (DESOWEN) 0.05 % cream APPLY BY TOPICAL ROUTE 2 TIMES EVERY DAY TO THE AFFECTED AREAS OF EARS     • fluocinonide (LIDEX) 0.05 % external solution      • losartan (COZAAR) 100 MG tablet Take 1 tablet by mouth Daily. 90 tablet 3   • meloxicam (MOBIC) 15 MG tablet TAKE 1 TABLET BY MOUTH EVERY DAY 90 tablet 1   • multivitamin with minerals tablet tablet Take 1 tablet by mouth Daily.     • Omega-3 Fatty Acids (fish oil) 1000 MG capsule capsule Take 1,200 mg by mouth Daily With Breakfast.     • rosuvastatin (Crestor) 5 MG tablet Take 1 tablet by mouth Daily. (Patient taking differently: Take 5 mg by mouth Daily. Take 1/2 tablet daily) 90 tablet 1   • vitamin B-12 (CYANOCOBALAMIN) 1000 MCG tablet Take 1,000 mcg by mouth Daily.     • ketoconazole (NIZORAL) 2 % shampoo      • neomycin-polymyxin-dexamethasone (MAXITROL) 3.5-02960-8.1 ophthalmic suspension Prn       No current facility-administered medications for this visit. "       _____________________________________________________________________________________    CHIEF COMPLAINT     Annual Exam (8/9/21 last cpe), Hyperlipidemia, and Hypertension      HISTORY OF PRESENT ILLNESS      Antony presents for annual health maintenance visit.    · Last health maintenance visit: approximately 1 year ago  · General health: good  · Lifestyle:  · Attempting to lose weight?: Yes   · Diet: eats moderately healthy  · Exercise: exercises nearly every day, walk regularly   · Tobacco: Never used   · Alcohol: drinks almost daily and 1-2 drinks/occasion  · Work: Full-time  · Reproductive health:  · Sexually active?: Yes   · Concern for STD?: No   · Sexual problems?: No problems   · Sees Urologist?: Yes for hx prostate cancer   · Depression Screening:      PHQ-2/PHQ-9 Depression Screening 9/15/2022   Retired Total Score -   Little Interest or Pleasure in Doing Things 0-->not at all   Feeling Down, Depressed or Hopeless 0-->not at all   PHQ-9: Brief Depression Severity Measure Score 0         PHQ-2: 0 (Not depressed)     PHQ-9: 0 (Negative screening for depression)    Patient Care Team:  Favian Sanders APRN as PCP - General (Internal Medicine)  Femi Edmonds MD as Consulting Physician (Urology)  ______________________________________________________________________    ALLERGIES  No Known Allergies     PFSH:     The following portions of the patient's history were reviewed and updated as appropriate: Allergies / Current Medications / Past Medical History / Surgical History / Social History / Family History    PROBLEM LIST   Patient Active Problem List   Diagnosis   • Adenocarcinoma of prostate (HCC)   • Benign essential HTN   • ED (erectile dysfunction)   • Degenerative joint disease involving multiple joints   • HLD (hyperlipidemia)   • Excessive urination at night   • Hyperglycemia   • Cervical disc disorder with radiculopathy of mid-cervical region   • Weakness of right arm   • Cervical disc  disorder at C5-C6 level with radiculopathy   • Cervical disc disorder at C4-C5 level with radiculopathy   • Psoriasis   • Family history of colonic polyps       PAST MEDICAL HISTORY  Past Medical History:   Diagnosis Date   • Arthritis    • High blood pressure    • Prostate cancer (HCC)        SURGICAL HISTORY  Past Surgical History:   Procedure Laterality Date   • COLONOSCOPY  07/2013    Dr Mary Rabago ( Fam. Hx of Polyps)   • COLONOSCOPY N/A 12/15/2021    ENTIRE COLON WNL, RESCOPE IN 5 YRS, DR. MICHAEL HERNANDEZ AT [facility]   • FOOT SURGERY      9/2014 and 8/2010       SOCIAL HISTORY  Social History     Socioeconomic History   • Marital status:    • Number of children: 2   • Years of education: COLLEGE    Tobacco Use   • Smoking status: Never Smoker   • Smokeless tobacco: Never Used   Vaping Use   • Vaping Use: Never used   Substance and Sexual Activity   • Alcohol use: Yes   • Drug use: No   • Sexual activity: Yes       FAMILY HISTORY  Family History   Problem Relation Age of Onset   • No Known Problems Mother    • Heart failure Father        IMMUNIZATION HISTORY  Immunization History   Administered Date(s) Administered   • COVID-19 (PFIZER) PURPLE CAP 12/17/2021, 01/07/2022       ______________________________________________________________________    REVIEW OF SYSTEMS     Review of Systems   Constitutional: Negative.    HENT: Negative.    Eyes: Negative.    Respiratory: Negative.    Cardiovascular: Negative.    Gastrointestinal: Negative.    Endocrine: Negative.    Genitourinary: Negative.    Musculoskeletal: Negative.    Skin:        Psoriasis    Allergic/Immunologic: Negative.    Neurological: Negative.    Hematological: Negative.    Psychiatric/Behavioral: Negative.       PHYSICAL EXAMINATION     Physical Exam  Constitutional:       Appearance: Normal appearance.   HENT:      Head: Normocephalic and atraumatic.      Right Ear: Tympanic membrane normal.      Left Ear: Tympanic membrane normal.      Nose: Nose  normal. No congestion.      Mouth/Throat:      Mouth: Mucous membranes are moist.      Pharynx: Oropharynx is clear.   Eyes:      Conjunctiva/sclera: Conjunctivae normal.      Pupils: Pupils are equal, round, and reactive to light.   Neck:      Vascular: No carotid bruit.   Cardiovascular:      Rate and Rhythm: Normal rate and regular rhythm.      Pulses: Normal pulses.      Heart sounds: Normal heart sounds.   Pulmonary:      Effort: Pulmonary effort is normal.      Breath sounds: Normal breath sounds.   Abdominal:      General: There is no distension.      Palpations: Abdomen is soft.      Tenderness: There is no abdominal tenderness. There is no right CVA tenderness, left CVA tenderness or guarding.   Genitourinary:     Comments: Followed by urology   Musculoskeletal:         General: Normal range of motion.      Cervical back: Normal range of motion.      Right lower leg: No edema.      Left lower leg: No edema.   Lymphadenopathy:      Cervical: No cervical adenopathy.   Skin:     General: Skin is warm and dry.      Comments: Psoriasis, followed by dermatology    Neurological:      General: No focal deficit present.      Mental Status: He is alert and oriented to person, place, and time.      Cranial Nerves: No cranial nerve deficit.      Motor: No weakness.      Coordination: Coordination normal.      Gait: Gait normal.   Psychiatric:         Mood and Affect: Mood normal.         Behavior: Behavior normal.         Thought Content: Thought content normal.         Judgment: Judgment normal.        REVIEWED DATA      Labs:    Lab Results   Component Value Date     09/12/2022    K 4.4 09/12/2022    CALCIUM 9.4 09/12/2022    AST 19 09/12/2022    ALT 43 (H) 09/12/2022    BUN 12 09/12/2022    CREATININE 0.88 09/12/2022    CREATININE 0.85 03/25/2022    CREATININE 0.87 02/09/2022    EGFRIFNONA 94 02/09/2022    EGFRIFAFRI 109 02/09/2022       Lab Results   Component Value Date    HGBA1C 5.00 09/12/2022    HGBA1C  4.90 08/02/2021    HGBA1C 4.90 02/27/2020    TSH 2.120 09/12/2022    FREET4 0.90 (L) 09/12/2022       Lab Results   Component Value Date    PSA 0.517 09/12/2022    PSA 0.413 08/02/2021    PSA 4.48 (H) 05/11/2015       Lab Results   Component Value Date     (H) 09/12/2022    HDL 61 (H) 09/12/2022    TRIG 105 09/12/2022    CHOLHDLRATIO 3.23 09/12/2022       No components found for: [id number]    Lab Results   Component Value Date    WBC 4.37 09/12/2022    HGB 15.2 09/12/2022    MCV 90.5 09/12/2022     09/12/2022       Lab Results   Component Value Date    PROTEIN Negative 09/12/2022    GLUCOSEU Negative 09/12/2022    BLOODU Negative 09/12/2022    NITRITEU Negative 09/12/2022    LEUKOCYTESUR Negative 09/12/2022        No results found for: HEPCVIRUSABY    Imaging:           Medical Tests:       ______________________________________________________________________    ASSESSMENT & PLAN     ANNUAL WELLNESS EXAM / PHYSICAL     Other medical problems addressed today:  Hypertension: Controlled with blood pressure today of 132/74.  Checked at work health screening in the high 120s over 70s. Denies any chest pain, shortness of air, headache, visual disturbances, lower extremity leg swelling, or heart palpitations.  Well-controlled on amlodipine 2.5 mg daily, losartan 100.      Hyperlipidemia: Significant improvement in cholesterol LDL was in the 150s now 1 teens, taking rosuvastatin 2.5 mg daily after decreasing to half a dose with mild increase in ALT at 65 from baseline 45-55.  With decreased ALT has decreased back to 43.  Difficult to determine whether increases from potential fatty liver or cholesterol medication.  He is having no myalgias with medication.    Summary/Discussion:     · At this time we will continue rosuvastatin at 2.5 mg daily.  If liver enzymes remain stable over the next 6 months we will likely increase this to 5 mg at next office visit.  · Continue current regimen for  hypertension  · Continue management with urology and dermatology for routine screenings.  · Up-to-date on colonoscopy.  · Declines vaccinations at this time.  · Follow-up in 6 months for chronic medical, 1 year annual physical    Next Appointment with me: Visit date not found    Return in about 6 months (around 3/15/2023) for Next scheduled follow up; 1 year annual .      HEALTHCARE MAINTENANCE ISSUES       Cancer Screening:   · Colon: Initial/Next screening at age: CURRENT  · Repeat colon cancer screening: every 5 years  · Prostate: Sees/will see a urologist for screening.  · Testicular: Recommended monthly self exam  · Skin: Monthly self skin examination, annual exam by health professional  · Lung: Does not meet criteria for lung cancer screening.   · Other:    Screening Labs & Tests:  · Lab results reviewed & discussed with with patient or orders placed today.  · EKG:  · CV Screening: Lipid panel  · DEXA (75+ or risk factors):   · HEP C (If born [id number] or risk factors): Negative screen  · Other:     Immunization/Vaccinations (to be given today unless deferred by patient)  · Influenza: Patient deferred/declined flu vaccine (recommended annual vaccination)  · Hepatitis A: Declined by patient  · Tetanus/Pertussis: Declined by patient  · Pneumovax: Not needed at this time  · Shingles: Patient declines vaccine  · Other:     Lifestyle Counseling:  · Lifestyle Modifications: Increase intensity/regularity of aerobic exercise  · Safety Issues: Always wear seatbelt, Avoid texting while driving   · Use sunscreen, regular skin examination  · Recommended annual dental/vision examination.  · Emotional/Stress/Sleep: Reviewed and  given when appropriate      Health Maintenance   Topic Date Due   • ZOSTER VACCINE (1 of 2) 09/15/2022 (Originally 11/28/2012)   • COVID-19 Vaccine (3 - Booster for Pfizer series) 09/17/2022 (Originally 6/7/2022)   • TDAP/TD VACCINES (1 - Tdap) 02/09/2023 (Originally 11/28/1979)   • INFLUENZA  VACCINE  10/01/2022   • LIPID PANEL  09/12/2023   • ANNUAL PHYSICAL  09/16/2023   • COLORECTAL CANCER SCREENING  12/15/2031   • HEPATITIS C SCREENING  Addressed   • Pneumococcal Vaccine 0-64  Aged Out         Examiner was wearing KN95 mask, face shield and exam gloves during the entire duration of the visit. Patient was masked the entire time.   Minimum social distance of 6 ft maintained entire visit except if physical contact was necessary as documented.     **Dragon Disclaimer:   Much of this encounter note is an electronic transcription/translation of spoken language to printed text. The electronic translation of spoken language may permit erroneous, or at times, nonsensical words or phrases to be inadvertently transcribed. Although I have reviewed the note for such errors, some may still exist.

## 2022-09-20 RX ORDER — ROSUVASTATIN CALCIUM 5 MG/1
5 TABLET, COATED ORAL DAILY
Qty: 90 TABLET | Refills: 3 | Status: SHIPPED | OUTPATIENT
Start: 2022-09-20 | End: 2022-09-21 | Stop reason: SDUPTHER

## 2022-09-21 RX ORDER — ROSUVASTATIN CALCIUM 5 MG/1
2.5 TABLET, COATED ORAL DAILY
Qty: 45 TABLET | Refills: 3 | Status: SHIPPED | OUTPATIENT
Start: 2022-09-21

## 2022-09-21 NOTE — TELEPHONE ENCOUNTER
Last Ov- 9/15/2022   Nex OV- 3/16/2023    PHARM IS REQUESTING CLARIFICATION. PT IS TAKING HALF TAB OF 5MG TAB. MM

## 2022-12-27 ENCOUNTER — OFFICE VISIT (OUTPATIENT)
Dept: INTERNAL MEDICINE | Age: 62
End: 2022-12-27

## 2022-12-27 VITALS
WEIGHT: 195.2 LBS | BODY MASS INDEX: 28.91 KG/M2 | TEMPERATURE: 98 F | SYSTOLIC BLOOD PRESSURE: 126 MMHG | HEIGHT: 69 IN | OXYGEN SATURATION: 99 % | DIASTOLIC BLOOD PRESSURE: 72 MMHG | HEART RATE: 70 BPM

## 2022-12-27 DIAGNOSIS — R19.8 DIFFICULTY SWALLOWING PILLS: Primary | ICD-10-CM

## 2022-12-27 PROBLEM — R13.10 DIFFICULTY SWALLOWING PILLS: Status: ACTIVE | Noted: 2022-12-27

## 2022-12-27 PROCEDURE — 99213 OFFICE O/P EST LOW 20 MIN: CPT | Performed by: NURSE PRACTITIONER

## 2022-12-27 RX ORDER — CLOBETASOL PROPIONATE 0.05 G/100ML
SHAMPOO TOPICAL
COMMUNITY
Start: 2022-11-16

## 2022-12-27 NOTE — PROGRESS NOTES
"    I N T E R N A L  M E D I C I N E  JO ANN Sibley    ENCOUNTER DATE:  12/27/2022    Antony LAGOS Gimenez / 62 y.o. / male      CHIEF COMPLAINT / REASON FOR OFFICE VISIT     trouble swallowing      ASSESSMENT & PLAN     Diagnoses and all orders for this visit:    1. Difficulty swallowing pills (Primary)  -     Ambulatory Referral to ENT (Otolaryngology)         SUMMARY/DISCUSSION  • Referral to ENT for esophageal stricture as having difficulty swallowing pills and states has a family history of Esophageal Cancer.   • Follow up with JO ANN Hand as scheduled and as needed  • I spent 25 min in direct care of this patient on this date of service. This time includes times spent by me in the following activities: Preparing for the visit, obtaining and/or reviewing a separately obtained history, performing a medically appropriate examination and/or evaluation, reviewing medical records, reviewing tests, ordering medications, tests, or procedures, counseling and educating the patient, documenting information in the medical record and reviewing office note/correspondence from other providers.     Return in about 3 months (around 3/16/2023) for Next scheduled follow up.      VITAL SIGNS     Visit Vitals  /72 (BP Location: Right arm, Patient Position: Sitting, Cuff Size: Adult)   Pulse 70   Temp 98 °F (36.7 °C) (Temporal)   Ht 175.3 cm (69\")   Wt 88.5 kg (195 lb 3.2 oz)   SpO2 99%   BMI 28.83 kg/m²           BP Readings from Last 3 Encounters:   12/27/22 126/72   09/15/22 132/74   02/09/22 140/92     Wt Readings from Last 3 Encounters:   12/27/22 88.5 kg (195 lb 3.2 oz)   09/15/22 88.9 kg (196 lb)   02/09/22 89.4 kg (197 lb 3.2 oz)     Body mass index is 28.83 kg/m².    Blood pressure readings recorded on patient flowsheet:  No flowsheet data found.          MEDICATIONS AT THE TIME OF OFFICE VISIT     Current Outpatient Medications on File Prior to Visit   Medication Sig Dispense Refill   • albuterol sulfate HFA " (Proventil HFA) 108 (90 Base) MCG/ACT inhaler Inhale 2 puffs Every 4 (Four) Hours As Needed for Wheezing. 6.7 g 5   • amLODIPine (NORVASC) 2.5 MG tablet Take 1 tablet by mouth Daily. 90 tablet 3   • ASPIRIN 81 PO Take 81 mg by mouth Daily.     • betamethasone, augmented, (DIPROLENE) 0.05 % lotion PLEASE SEE ATTACHED FOR DETAILED DIRECTIONS     • clobetasol propionate (CLOBEX) 0.05 % shampoo LATHER INTO SCALP 4-5 TIMES WEEKLY, LET SIT FOR 10 MINUTES, AND RINSE THOROUGHLY     • desonide (DESOWEN) 0.05 % cream APPLY BY TOPICAL ROUTE 2 TIMES EVERY DAY TO THE AFFECTED AREAS OF EARS     • fluocinonide (LIDEX) 0.05 % external solution      • losartan (COZAAR) 100 MG tablet Take 1 tablet by mouth Daily. 90 tablet 3   • meloxicam (MOBIC) 15 MG tablet TAKE 1 TABLET BY MOUTH EVERY DAY 90 tablet 1   • multivitamin with minerals tablet tablet Take 1 tablet by mouth Daily.     • Omega-3 Fatty Acids (fish oil) 1000 MG capsule capsule Take 1,200 mg by mouth Daily With Breakfast.     • rosuvastatin (CRESTOR) 5 MG tablet Take 0.5 tablets by mouth Daily. Take 1/2 tablet daily 45 tablet 3   • vitamin B-12 (CYANOCOBALAMIN) 1000 MCG tablet Take 1,000 mcg by mouth Daily.     • [DISCONTINUED] ketoconazole (NIZORAL) 2 % shampoo      • [DISCONTINUED] neomycin-polymyxin-dexamethasone (MAXITROL) 3.5-11713-8.1 ophthalmic suspension Prn       No current facility-administered medications on file prior to visit.        HISTORY OF PRESENT ILLNESS     62 year old male being seen today for difficulty swallowing pills for the past 2 weeks, but no difficulty swallowing food and no throat swelling noted. States this is a new problem for patient. Patient states family Hx of Esophageal Cancer and concerned.       Patient Care Team:  Favian Sanders APRN as PCP - General (Internal Medicine)  Femi Edmonds MD as Consulting Physician (Urology)    REVIEW OF SYSTEMS     Review of Systems   Constitutional: Negative for appetite change, fatigue and  fever.   HENT: Positive for trouble swallowing. Negative for congestion, sore throat and voice change.    Respiratory: Negative.  Negative for cough, chest tightness and shortness of breath.    Cardiovascular: Negative.  Negative for chest pain and palpitations.   Gastrointestinal: Negative.    Musculoskeletal: Negative for neck pain and neck stiffness.   Skin: Negative.    Neurological: Negative for dizziness, numbness and headaches.   Psychiatric/Behavioral: Negative.           PHYSICAL EXAMINATION     Physical Exam  HENT:      Nose: Nose normal.      Mouth/Throat:      Mouth: Mucous membranes are moist.      Pharynx: Oropharynx is clear. No oropharyngeal exudate or posterior oropharyngeal erythema.   Cardiovascular:      Rate and Rhythm: Normal rate and regular rhythm.      Pulses: Normal pulses.      Heart sounds: Normal heart sounds.   Pulmonary:      Effort: Pulmonary effort is normal. No respiratory distress.      Breath sounds: Normal breath sounds. No wheezing, rhonchi or rales.   Musculoskeletal:      Cervical back: Normal range of motion and neck supple. No rigidity or tenderness.   Lymphadenopathy:      Cervical: No cervical adenopathy.   Skin:     General: Skin is warm and dry.   Neurological:      Mental Status: He is alert. Mental status is at baseline.             REVIEWED DATA     Labs:     Lab Results   Component Value Date     09/12/2022    K 4.4 09/12/2022    CALCIUM 9.4 09/12/2022    AST 19 09/12/2022    ALT 43 (H) 09/12/2022    BUN 12 09/12/2022    CREATININE 0.88 09/12/2022    CREATININE 0.85 03/25/2022    CREATININE 0.87 02/09/2022    EGFRIFNONA 94 02/09/2022    EGFRIFAFRI 109 02/09/2022       Lab Results   Component Value Date    HGBA1C 5.00 09/12/2022    HGBA1C 4.90 08/02/2021    HGBA1C 4.90 02/27/2020       Lab Results   Component Value Date     (H) 09/12/2022    LDL 98 03/25/2022     (H) 02/09/2022    HDL 61 (H) 09/12/2022    HDL 56 03/25/2022    TRIG 105 09/12/2022     TRIG 141 03/25/2022       Lab Results   Component Value Date    TSH 2.120 09/12/2022    TSH 2.520 08/02/2021    FREET4 0.90 (L) 09/12/2022    FREET4 1.11 08/02/2021       Lab Results   Component Value Date    WBC 4.37 09/12/2022    HGB 15.2 09/12/2022     09/12/2022       No results found for: MALBCRERATIO       Imaging:           Medical Tests:           Summary of old records / correspondence / consultant report:           Request outside records:           JO ANN Sibley  Answers for HPI/ROS submitted by the patient on 12/26/2022  What is the primary reason for your visit?: Other  Please describe your symptoms.: some difficulty in swallowing  Have you had these symptoms before?: No  How long have you been having these symptoms?: Greater than 2 weeks  Please list any medications you are currently taking for this condition.: none

## 2023-02-01 RX ORDER — AMLODIPINE BESYLATE 2.5 MG/1
TABLET ORAL
Qty: 90 TABLET | Refills: 3 | Status: SHIPPED | OUTPATIENT
Start: 2023-02-01

## 2023-03-23 ENCOUNTER — TELEPHONE (OUTPATIENT)
Dept: GASTROENTEROLOGY | Facility: CLINIC | Age: 63
End: 2023-03-23
Payer: COMMERCIAL

## 2023-03-23 ENCOUNTER — OFFICE VISIT (OUTPATIENT)
Dept: GASTROENTEROLOGY | Facility: CLINIC | Age: 63
End: 2023-03-23
Payer: COMMERCIAL

## 2023-03-23 ENCOUNTER — OFFICE VISIT (OUTPATIENT)
Dept: INTERNAL MEDICINE | Age: 63
End: 2023-03-23
Payer: COMMERCIAL

## 2023-03-23 VITALS
SYSTOLIC BLOOD PRESSURE: 122 MMHG | DIASTOLIC BLOOD PRESSURE: 76 MMHG | TEMPERATURE: 97.3 F | HEIGHT: 69 IN | BODY MASS INDEX: 29.03 KG/M2 | WEIGHT: 196 LBS | OXYGEN SATURATION: 99 % | HEART RATE: 72 BPM

## 2023-03-23 VITALS
BODY MASS INDEX: 28.68 KG/M2 | SYSTOLIC BLOOD PRESSURE: 164 MMHG | OXYGEN SATURATION: 96 % | WEIGHT: 193.6 LBS | DIASTOLIC BLOOD PRESSURE: 95 MMHG | HEART RATE: 68 BPM | TEMPERATURE: 97.8 F | HEIGHT: 69 IN

## 2023-03-23 DIAGNOSIS — R13.10 DYSPHAGIA, UNSPECIFIED TYPE: ICD-10-CM

## 2023-03-23 DIAGNOSIS — E78.2 MIXED HYPERLIPIDEMIA: Primary | ICD-10-CM

## 2023-03-23 DIAGNOSIS — Z83.71 FAMILY HISTORY OF COLONIC POLYPS: Primary | ICD-10-CM

## 2023-03-23 DIAGNOSIS — I10 BENIGN ESSENTIAL HTN: ICD-10-CM

## 2023-03-23 PROCEDURE — 99204 OFFICE O/P NEW MOD 45 MIN: CPT | Performed by: INTERNAL MEDICINE

## 2023-03-23 PROCEDURE — 99214 OFFICE O/P EST MOD 30 MIN: CPT | Performed by: NURSE PRACTITIONER

## 2023-03-23 RX ORDER — PANTOPRAZOLE SODIUM 40 MG/1
TABLET, DELAYED RELEASE ORAL
COMMUNITY
Start: 2023-01-16

## 2023-03-23 NOTE — TELEPHONE ENCOUNTER
Per. Dr. Patel, pt having barium swallow via Radiology.  Provided pt with Radiology phone number for any followups or questions.

## 2023-03-23 NOTE — PROGRESS NOTES
"    I N T E R N A L  M E D I C I N E  CHANTEL MONGE, APRN      ENCOUNTER DATE:  03/23/2023    Antony LAGOS Gimenez / 62 y.o. / male      CHIEF COMPLAINT / REASON FOR OFFICE VISIT     Hyperlipidemia and Hypertension      ASSESSMENT & PLAN     Problem List Items Addressed This Visit        Cardiac and Vasculature    Benign essential HTN    Relevant Medications    losartan (COZAAR) 100 MG tablet    amLODIPine (NORVASC) 2.5 MG tablet    HLD (hyperlipidemia) - Primary    Relevant Medications    rosuvastatin (CRESTOR) 5 MG tablet     No orders of the defined types were placed in this encounter.    No orders of the defined types were placed in this encounter.      SUMMARY/DISCUSSION  • Patient has lab orders in from February for lipid panel along with CMP in which he will have completed within the next couple of weeks.  • Continue current medication regimens at this time until labs return    Next Appointment with me: Visit date not found    Return for Next scheduled follow up; lab only appointment in the next couple of weeks .      VITAL SIGNS     Visit Vitals  /76   Pulse 72   Temp 97.3 °F (36.3 °C) (Temporal)   Ht 175.3 cm (69\")   Wt 88.9 kg (196 lb)   SpO2 99%   BMI 28.94 kg/m²       @BP@  Wt Readings from Last 3 Encounters:   03/23/23 88.9 kg (196 lb)   03/23/23 87.8 kg (193 lb 9.6 oz)   12/27/22 88.5 kg (195 lb 3.2 oz)     Body mass index is 28.94 kg/m².      MEDICATIONS AT THE TIME OF OFFICE VISIT     Current Outpatient Medications on File Prior to Visit   Medication Sig   • albuterol sulfate HFA (Proventil HFA) 108 (90 Base) MCG/ACT inhaler Inhale 2 puffs Every 4 (Four) Hours As Needed for Wheezing.   • amLODIPine (NORVASC) 2.5 MG tablet TAKE 1 TABLET BY MOUTH EVERY DAY   • ASPIRIN 81 PO Take 81 mg by mouth Daily.   • betamethasone, augmented, (DIPROLENE) 0.05 % lotion PLEASE SEE ATTACHED FOR DETAILED DIRECTIONS   • clobetasol propionate (CLOBEX) 0.05 % shampoo LATHER INTO SCALP 4-5 TIMES WEEKLY, LET SIT FOR 10 " MINUTES, AND RINSE THOROUGHLY   • desonide (DESOWEN) 0.05 % cream APPLY BY TOPICAL ROUTE 2 TIMES EVERY DAY TO THE AFFECTED AREAS OF EARS   • fluocinonide (LIDEX) 0.05 % external solution    • losartan (COZAAR) 100 MG tablet Take 1 tablet by mouth Daily.   • meloxicam (MOBIC) 15 MG tablet TAKE 1 TABLET BY MOUTH EVERY DAY   • multivitamin with minerals tablet tablet Take 1 tablet by mouth Daily.   • Omega-3 Fatty Acids (fish oil) 1000 MG capsule capsule Take 1,200 mg by mouth Daily With Breakfast.   • rosuvastatin (CRESTOR) 5 MG tablet Take 0.5 tablets by mouth Daily. Take 1/2 tablet daily   • vitamin B-12 (CYANOCOBALAMIN) 1000 MCG tablet Take 1 tablet by mouth Daily.     No current facility-administered medications on file prior to visit.          HISTORY OF PRESENT ILLNESS     Hypertension: Controlled with blood pressure today of 122/76. Denies any chest pain, shortness of air, headache, visual disturbances, lower extremity leg swelling, or heart palpitations.  Well-controlled on amlodipine 2.5 mg daily, losartan 100.      Hyperlipidemia: Significant improvement in cholesterol LDL was in the 150s now 1 teens, taking rosuvastatin 2.5 mg daily after decreasing to half a dose with mild increase in ALT at 65 from baseline 45-55.  With decreased ALT has decreased back to 43.      Recent trouble swallowing, was referred to ENT who tried patient on Protonix with no improvement, now scheduled with gastroenterology with a barium swallow study as family history of esophageal stomach cancer.    REVIEW OF SYSTEMS     Constitutional neg except per HPI   Resp neg  CV neg    PHYSICAL EXAMINATION     Physical Exam  Constitutional  No distress  Cardiovascular Rate  normal . Rhythm: regular . Heart sounds:  normal  Pulmonary/Chest  Effort normal. Breath sounds:  normal  Psychiatric  Alert. Judgment and thought content normal. Mood normal     REVIEWED DATA     Labs:   Lab Results   Component Value Date    GLUCOSE 118 (H) 09/12/2022     BUN 12 09/12/2022    CREATININE 0.88 09/12/2022    EGFRRESULT 99.1 09/12/2022    EGFR >60 08/25/2014    BCR 13.6 09/12/2022    K 4.4 09/12/2022    CO2 26.3 09/12/2022    CALCIUM 9.4 09/12/2022    PROTENTOTREF 6.8 09/12/2022    ALBUMIN 4.40 09/12/2022    BILITOT 0.5 09/12/2022    AST 19 09/12/2022    ALT 43 (H) 09/12/2022     Lab Results   Component Value Date    CHLPL 197 09/12/2022    TRIG 105 09/12/2022    HDL 61 (H) 09/12/2022     (H) 09/12/2022     Lab Results   Component Value Date    TSH 2.120 09/12/2022     Lab Results   Component Value Date    WBC 4.37 09/12/2022    HGB 15.2 09/12/2022    HCT 43.8 09/12/2022    MCV 90.5 09/12/2022     09/12/2022     Lab Results   Component Value Date    HGBA1C 5.00 09/12/2022     Brief Urine Lab Results  (Last result in the past 365 days)      Color   Clarity   Blood   Leuk Est   Nitrite   Protein   CREAT   Urine HCG        09/12/22 0924 Yellow  Comment: Urine microscopic not indicated.  REFERENCE RANGE: Yellow, Straw     Clear   Negative   Negative   Negative   Negative               Lab Results   Component Value Date    PSA 0.517 09/12/2022    PSA 0.413 08/02/2021    PSA 4.48 (H) 05/11/2015         Imaging:           Medical Tests:           Summary of old records / correspondence / consultant report:           Request outside records:             *Examiner was wearing medical surgical mask.   **Dragon dictation used for documentation.

## 2023-05-02 ENCOUNTER — HOSPITAL ENCOUNTER (OUTPATIENT)
Dept: GENERAL RADIOLOGY | Facility: HOSPITAL | Age: 63
Discharge: HOME | End: 2023-05-02
Admitting: INTERNAL MEDICINE
Payer: COMMERCIAL

## 2023-05-02 DIAGNOSIS — R13.10 DYSPHAGIA, UNSPECIFIED TYPE: ICD-10-CM

## 2023-05-02 DIAGNOSIS — M26.623 BILATERAL TEMPOROMANDIBULAR JOINT PAIN: ICD-10-CM

## 2023-05-02 PROCEDURE — 74221 X-RAY XM ESOPHAGUS 2CNTRST: CPT

## 2023-05-02 RX ORDER — MELOXICAM 15 MG/1
TABLET ORAL
Qty: 90 TABLET | Refills: 1 | Status: SHIPPED | OUTPATIENT
Start: 2023-05-02

## 2023-05-02 RX ADMIN — ANTACID/ANTIFLATULENT 1 PACKET: 380; 550; 10; 10 GRANULE, EFFERVESCENT ORAL at 09:24

## 2023-05-02 RX ADMIN — BARIUM SULFATE 700 MG: 700 TABLET ORAL at 09:10

## 2023-05-02 RX ADMIN — BARIUM SULFATE 135 ML: 980 POWDER, FOR SUSPENSION ORAL at 09:15

## 2023-05-02 RX ADMIN — BARIUM SULFATE 183 ML: 960 POWDER, FOR SUSPENSION ORAL at 09:10

## 2023-05-03 NOTE — PROGRESS NOTES
05/03/23       Tell him that the barium swallow came back normal.  The 13 mm barium tablet passed through the esophagus and into the stomach without delay and did not get stuck, which is good.  Have him follow-up with me in the office and we can discuss whether further testing (such as an EGD?)  Should be done.  Please send a copy of this report to his PCP.  Mimi link

## 2023-06-01 ENCOUNTER — TELEPHONE (OUTPATIENT)
Dept: GASTROENTEROLOGY | Facility: CLINIC | Age: 63
End: 2023-06-01

## 2023-06-01 NOTE — TELEPHONE ENCOUNTER
----- Message from Ramez Patel MD sent at 5/3/2023  6:41 PM EDT -----  05/03/23       Tell him that the barium swallow came back normal.  The 13 mm barium tablet passed through the esophagus and into the stomach without delay and did not get stuck, which is good.  Have him follow-up with me in the office and we can discuss whether further testing (such as an EGD?)  Should be done.  Please send a copy of this report to his PCP.  Bear. [email or web address]

## 2023-06-01 NOTE — TELEPHONE ENCOUNTER
Called pt and spoke with pt's wife on [employer] and advised of Dr Betancourt' note. Verb understanding.     Results sent to SHANON Sanders NP thru [facility].

## 2023-08-03 RX ORDER — ROSUVASTATIN CALCIUM 5 MG/1
TABLET, COATED ORAL
Qty: 45 TABLET | Refills: 1 | Status: SHIPPED | OUTPATIENT
Start: 2023-08-03

## 2023-08-24 DIAGNOSIS — I10 BENIGN ESSENTIAL HTN: ICD-10-CM

## 2023-08-24 RX ORDER — LOSARTAN POTASSIUM 100 MG/1
TABLET ORAL
Qty: 90 TABLET | Refills: 1 | Status: SHIPPED | OUTPATIENT
Start: 2023-08-24

## 2023-11-02 DIAGNOSIS — M26.623 BILATERAL TEMPOROMANDIBULAR JOINT PAIN: ICD-10-CM

## 2023-11-02 RX ORDER — MELOXICAM 15 MG/1
TABLET ORAL
Qty: 90 TABLET | Refills: 0 | Status: SHIPPED | OUTPATIENT
Start: 2023-11-02

## 2023-11-10 ENCOUNTER — OFFICE VISIT (OUTPATIENT)
Dept: INTERNAL MEDICINE | Age: 63
End: 2023-11-10
Payer: COMMERCIAL

## 2023-11-10 VITALS
WEIGHT: 190 LBS | HEIGHT: 69 IN | DIASTOLIC BLOOD PRESSURE: 88 MMHG | SYSTOLIC BLOOD PRESSURE: 134 MMHG | BODY MASS INDEX: 28.14 KG/M2 | TEMPERATURE: 98.2 F | OXYGEN SATURATION: 100 % | HEART RATE: 57 BPM

## 2023-11-10 DIAGNOSIS — E78.2 MIXED HYPERLIPIDEMIA: ICD-10-CM

## 2023-11-10 DIAGNOSIS — I10 BENIGN ESSENTIAL HTN: ICD-10-CM

## 2023-11-10 DIAGNOSIS — Z00.00 ENCOUNTER FOR ANNUAL PHYSICAL EXAM: Primary | ICD-10-CM

## 2023-11-10 RX ORDER — TADALAFIL 5 MG/1
TABLET ORAL
COMMUNITY
Start: 2023-11-02

## 2023-11-10 RX ORDER — AMLODIPINE BESYLATE 5 MG/1
5 TABLET ORAL DAILY
Qty: 90 TABLET | Refills: 1 | Status: SHIPPED | OUTPATIENT
Start: 2023-11-10

## 2023-11-10 NOTE — PROGRESS NOTES
"[facility] [place] INTERNAL MEDICINE  JO ANN Vides Gimenez / 62 y.o. / male  11/10/2023                        VITALS     Visit Vitals  /88   Pulse 57   Temp 98.2 °F (36.8 °C) (Temporal)   Ht 175.3 cm (69\")   Wt 86.2 kg (190 lb)   SpO2 100%   BMI 28.06 kg/m²       BP Readings from Last 3 Encounters:   11/10/23 134/88   03/23/23 122/76   03/23/23 164/95     Wt Readings from Last 3 Encounters:   11/10/23 86.2 kg (190 lb)   03/23/23 88.9 kg (196 lb)   03/23/23 87.8 kg (193 lb 9.6 oz)      Body mass index is 28.06 kg/m².    MEDICATIONS     Current Outpatient Medications   Medication Sig Dispense Refill    albuterol sulfate HFA (Proventil HFA) 108 (90 Base) MCG/ACT inhaler Inhale 2 puffs Every 4 (Four) Hours As Needed for Wheezing. 6.7 g 5    ASPIRIN 81 PO Take 81 mg by mouth Daily.      betamethasone, augmented, (DIPROLENE) 0.05 % lotion PLEASE SEE ATTACHED FOR DETAILED DIRECTIONS      clobetasol propionate (CLOBEX) 0.05 % shampoo LATHER INTO SCALP 4-5 TIMES WEEKLY, LET SIT FOR 10 MINUTES, AND RINSE THOROUGHLY      desonide (DESOWEN) 0.05 % cream APPLY BY TOPICAL ROUTE 2 TIMES EVERY DAY TO THE AFFECTED AREAS OF EARS      fluocinonide (LIDEX) 0.05 % external solution       losartan (COZAAR) 100 MG tablet TAKE 1 TABLET BY MOUTH EVERY DAY 90 tablet 1    meloxicam (MOBIC) 15 MG tablet TAKE 1 TABLET BY MOUTH EVERY DAY 90 tablet 0    multivitamin with minerals tablet tablet Take 1 tablet by mouth Daily.      Omega-3 Fatty Acids (fish oil) 1000 MG capsule capsule Take 1,200 mg by mouth Daily With Breakfast.      pantoprazole (PROTONIX) 40 MG EC tablet TAKE 1 TABLET BY MOUTH EVERY DAY ROUGHLY AN HOUR BEFORE DINNER      rosuvastatin (CRESTOR) 5 MG tablet TAKE A HALF TABLET BY MOUTH EVERY DAY 45 tablet 1    tadalafil (CIALIS) 5 MG tablet TAKE 1 TABLET BY MOUTH EVERY DAY (PA REQUIRED)      vitamin B-12 (CYANOCOBALAMIN) 1000 MCG tablet Take 1 tablet by mouth Daily.      amLODIPine (NORVASC) 5 MG tablet Take 1 tablet by " mouth Daily. 90 tablet 1     No current facility-administered medications for this visit.       _____________________________________________________________________________________    CHIEF COMPLAINT     Annual Exam, Hyperlipidemia, and Hypertension      HISTORY OF PRESENT ILLNESS      Antony presents for annual health maintenance visit.    Last health maintenance visit: approximately 1 year ago  General health: good  Lifestyle:  Attempting to lose weight?: Yes   Diet: eats a well balanced, healthy diet  Exercise: exercises 3-5 days weekly  Tobacco: Never used   Alcohol: 7 days/week and 3 drinks/occasion   Work: Full-time  Reproductive health:  Sexually active?: Yes   Concern for STD?: No   Sexual problems?: erectile dysfunction   Sees Urologist?: Yes   Depression Screenin/10/2023    10:22 AM   PHQ-2/PHQ-9 Depression Screening   Little Interest or Pleasure in Doing Things 0-->not at all   Feeling Down, Depressed or Hopeless 0-->not at all   PHQ-9: Brief Depression Severity Measure Score 0         PHQ-2: 0 (Not depressed)     PHQ-9: 0 (Negative screening for depression)    Patient Care Team:  Favian Sanders APRN as PCP - General (Internal Medicine)  Femi Edmonds MD as Consulting Physician (Urology)  ______________________________________________________________________    ALLERGIES  No Known Allergies     PFSH:     The following portions of the patient's history were reviewed and updated as appropriate: Allergies / Current Medications / Past Medical History / Surgical History / Social History / Family History    PROBLEM LIST   Patient Active Problem List   Diagnosis    Adenocarcinoma of prostate    Benign essential HTN    ED (erectile dysfunction)    Degenerative joint disease involving multiple joints    HLD (hyperlipidemia)    Excessive urination at night    Hyperglycemia    Cervical disc disorder with radiculopathy of mid-cervical region    Weakness of right arm    Cervical disc disorder  at C5-C6 level with radiculopathy    Cervical disc disorder at C4-C5 level with radiculopathy    Psoriasis    Family history of colonic polyps    Difficulty swallowing pills       PAST MEDICAL HISTORY  Past Medical History:   Diagnosis Date    Arthritis     High blood pressure     Prostate cancer        SURGICAL HISTORY  Past Surgical History:   Procedure Laterality Date    COLONOSCOPY  07/2013    Dr Mary Rabago ( Fam. Hx of Polyps)    COLONOSCOPY N/A 12/15/2021    ENTIRE COLON WNL, RESCOPE IN 5 YRS, DR. MICHAEL HERNANDEZ AT [facility]    FOOT SURGERY      9/2014 and 8/2010       SOCIAL HISTORY  Social History     Socioeconomic History    Marital status:     Number of children: 2    Years of education: COLLEGE    Tobacco Use    Smoking status: Never    Smokeless tobacco: Never   Vaping Use    Vaping Use: Never used   Substance and Sexual Activity    Alcohol use: Yes     Alcohol/week: 10.0 standard drinks of alcohol     Types: 5 Glasses of wine, 5 Drinks containing 0.5 oz of alcohol per week    Drug use: No    Sexual activity: Yes     Partners: Female     Birth control/protection: None       FAMILY HISTORY  Family History   Problem Relation Age of Onset    No Known Problems Mother     Heart failure Father     Alcohol abuse Father     Cancer Brother         esophageal/stomach       IMMUNIZATION HISTORY  Immunization History   Administered Date(s) Administered    COVID-19 (PFIZER) Purple Cap Monovalent 12/17/2021, 01/07/2022       ______________________________________________________________________    REVIEW OF SYSTEMS     Review of Systems  Constitutional: Negative.    HENT: Negative.    Eyes: Negative.    Respiratory: Negative.    Cardiovascular: Negative.    Gastrointestinal: Negative.    Endocrine: Negative.    Genitourinary: Negative.    Musculoskeletal: Right shoulder pain, plans on following with Ortho.  Skin:        Psoriasis    Allergic/Immunologic: Negative.    Neurological: Negative.    Hematological: Negative.     Psychiatric/Behavioral: Negative.      PHYSICAL EXAMINATION     Physical Exam  Constitutional:       Appearance: Normal appearance.   HENT:      Head: Normocephalic and atraumatic.      Right Ear: Tympanic membrane normal.      Left Ear: Tympanic membrane normal.      Nose: Nose normal. No congestion.      Mouth/Throat:      Mouth: Mucous membranes are moist.      Pharynx: Oropharynx is clear.   Eyes:      Conjunctiva/sclera: Conjunctivae normal.      Pupils: Pupils are equal, round, and reactive to light.   Neck:      Vascular: No carotid bruit.   Cardiovascular:      Rate and Rhythm: Normal rate and regular rhythm.      Pulses: Normal pulses.      Heart sounds: Normal heart sounds.   Pulmonary:      Effort: Pulmonary effort is normal.      Breath sounds: Normal breath sounds.   Abdominal:      General: There is no distension.      Palpations: Abdomen is soft.      Tenderness: There is no abdominal tenderness. There is no right CVA tenderness, left CVA tenderness or guarding.   Genitourinary:     Comments: Followed by urology   Musculoskeletal:         General: Normal range of motion.      Cervical back: Normal range of motion.      Right lower leg: No edema.      Left lower leg: No edema.   Lymphadenopathy:      Cervical: No cervical adenopathy.   Skin:     General: Skin is warm and dry.      Comments: Psoriasis, followed by dermatology    Neurological:      General: No focal deficit present.      Mental Status: He is alert and oriented to person, place, and time.      Cranial Nerves: No cranial nerve deficit.      Motor: No weakness.      Coordination: Coordination normal.      Gait: Gait normal.   Psychiatric:         Mood and Affect: Mood normal.         Behavior: Behavior normal.         Thought Content: Thought content normal.         Judgment: Judgment normal.     REVIEWED DATA      Labs:    Lab Results   Component Value Date     04/03/2023    K 4.1 04/03/2023    CALCIUM 9.9 04/03/2023    AST 17  "04/03/2023    ALT 29 04/03/2023    BUN 15 04/03/2023    CREATININE 0.92 04/03/2023    CREATININE 0.88 09/12/2022    CREATININE 0.85 03/25/2022    EGFRIFNONA 94 02/09/2022    EGFRIFAFRI 109 02/09/2022       Lab Results   Component Value Date    HGBA1C 5.00 09/12/2022    HGBA1C 4.90 08/02/2021    HGBA1C 4.90 02/27/2020    TSH 2.120 09/12/2022    FREET4 0.90 (L) 09/12/2022       Lab Results   Component Value Date    PSA 0.517 09/12/2022    PSA 0.413 08/02/2021    PSA 4.48 (H) 05/11/2015       Lab Results   Component Value Date     (H) 04/03/2023    HDL 54 04/03/2023    TRIG 153 (H) 04/03/2023    CHOLHDLRATIO 3.46 04/03/2023       No components found for: \"TDDE528R\"    Lab Results   Component Value Date    WBC 4.37 09/12/2022    HGB 15.2 09/12/2022    MCV 90.5 09/12/2022     09/12/2022       Lab Results   Component Value Date    PROTEIN Negative 09/12/2022    GLUCOSEU Negative 09/12/2022    BLOODU Negative 09/12/2022    NITRITEU Negative 09/12/2022    LEUKOCYTESUR Negative 09/12/2022        No results found for: \"HEPCVIRUSABY\"    Imaging:           Medical Tests:       ______________________________________________________________________    ASSESSMENT & PLAN     ANNUAL WELLNESS EXAM / PHYSICAL     Other medical problems addressed today:  Hypertension: Denies any chest pain, shortness of air, headache, visual disturbances, lower extremity leg swelling, or heart palpitations.  Only moderately controlled with amlodipine 2.5 mg daily, losartan 100.  Blood pressure at home averaging in the 130s over 80s.     Hyperlipidemia: Has had recent labs at [employer] for [employer] [facility] at work.  His hemoglobin A1c was 4.7.  Triglycerides 64, HDL 64, LDL of 97.  Improvement in diet.  On rosuvastatin 5 mg daily.    Recent trouble swallowing, was referred to ENT who tried patient on Protonix with no improvement, now scheduled with gastroenterology with a barium swallow study as family history of esophageal stomach " cancer.  Normal study.    Summary/Discussion:     Patient has had recent health labs through his work.  We will refrain from labs today.  Continue current regimen for hyperlipidemia but increase amlodipine from 2.5 to 5 mg daily with blood pressure only moderately controlled.  Up-to-date on colonoscopy, vaccinations at this time.    Next Appointment with me: Visit date not found    Return in about 6 months (around 5/10/2024) for Next scheduled follow up; 1 year annual .      HEALTHCARE MAINTENANCE ISSUES       Cancer Screening:  Colon: Initial/Next screening at age: CURRENT  Repeat colon cancer screening: every 5 years  Prostate: followed by urology   Testicular: Recommended monthly self exam  Skin: Monthly self skin examination, annual exam by health professional  Lung: Does not meet criteria for lung cancer screening.   Other:    Screening Labs & Tests:  Lab results reviewed & discussed with with patient or orders placed today.  EKG:  CV Screening: Lipid panel  DEXA (75+ or risk factors):   HEP C (If born [id number] or risk factors): Negative screen  Other:     Immunization/Vaccinations (to be given today unless deferred by patient)  Influenza: Patient deferred/declined flu vaccine (recommended annual vaccination)  Hepatitis A: Declined by patient  Tetanus/Pertussis: Declined by patient  Pneumovax: Not needed at this time  Shingles: Patient declines vaccine  Other:     Lifestyle Counseling:  Lifestyle Modifications: Improve dietary compliance and Increase intensity/regularity of aerobic exercise  Safety Issues: Always wear seatbelt, Avoid texting while driving   Use sunscreen, regular skin examination  Recommended annual dental/vision examination.  Emotional/Stress/Sleep: Reviewed and  given when appropriate      Health Maintenance   Topic Date Due    ZOSTER VACCINE (1 of 2) 11/10/2023 (Originally 11/28/2012)    COVID-19 Vaccine (3 - 2023-24 season) 11/12/2023 (Originally 9/1/2023)    INFLUENZA VACCINE   03/31/2024 (Originally 8/1/2023)    TDAP/TD VACCINES (1 - Tdap) 11/10/2024 (Originally 11/28/1979)    BMI FOLLOWUP  12/27/2023    LIPID PANEL  04/03/2024    ANNUAL PHYSICAL  11/10/2024    COLORECTAL CANCER SCREENING  12/15/2031    HEPATITIS C SCREENING  Addressed    Pneumococcal Vaccine 0-64  Aged Out     *Dragon dictation used for documentation.

## 2024-01-29 DIAGNOSIS — M26.623 BILATERAL TEMPOROMANDIBULAR JOINT PAIN: ICD-10-CM

## 2024-01-29 RX ORDER — ROSUVASTATIN CALCIUM 5 MG/1
TABLET, COATED ORAL
Qty: 45 TABLET | Refills: 1 | Status: SHIPPED | OUTPATIENT
Start: 2024-01-29

## 2024-01-29 RX ORDER — MELOXICAM 15 MG/1
TABLET ORAL
Qty: 90 TABLET | Refills: 0 | Status: SHIPPED | OUTPATIENT
Start: 2024-01-29

## 2024-01-29 RX ORDER — AMLODIPINE BESYLATE 2.5 MG/1
TABLET ORAL
Qty: 90 TABLET | Refills: 3 | OUTPATIENT
Start: 2024-01-29

## 2024-01-30 DIAGNOSIS — I10 BENIGN ESSENTIAL HTN: ICD-10-CM

## 2024-01-31 RX ORDER — AMLODIPINE BESYLATE 5 MG/1
5 TABLET ORAL DAILY
Qty: 90 TABLET | Refills: 1 | Status: SHIPPED | OUTPATIENT
Start: 2024-01-31

## 2024-02-21 DIAGNOSIS — I10 BENIGN ESSENTIAL HTN: ICD-10-CM

## 2024-02-21 RX ORDER — LOSARTAN POTASSIUM 100 MG/1
TABLET ORAL
Qty: 90 TABLET | Refills: 1 | Status: SHIPPED | OUTPATIENT
Start: 2024-02-21

## 2024-04-26 DIAGNOSIS — M26.623 BILATERAL TEMPOROMANDIBULAR JOINT PAIN: ICD-10-CM

## 2024-04-26 RX ORDER — MELOXICAM 15 MG/1
TABLET ORAL
Qty: 90 TABLET | Refills: 1 | Status: SHIPPED | OUTPATIENT
Start: 2024-04-26

## 2024-05-10 ENCOUNTER — OFFICE VISIT (OUTPATIENT)
Dept: INTERNAL MEDICINE | Age: 64
End: 2024-05-10
Payer: COMMERCIAL

## 2024-05-10 VITALS
WEIGHT: 190.2 LBS | OXYGEN SATURATION: 99 % | HEART RATE: 70 BPM | SYSTOLIC BLOOD PRESSURE: 128 MMHG | TEMPERATURE: 98.1 F | BODY MASS INDEX: 28.17 KG/M2 | HEIGHT: 69 IN | DIASTOLIC BLOOD PRESSURE: 84 MMHG

## 2024-05-10 DIAGNOSIS — R73.9 HYPERGLYCEMIA: ICD-10-CM

## 2024-05-10 DIAGNOSIS — I10 BENIGN ESSENTIAL HTN: Primary | ICD-10-CM

## 2024-05-10 DIAGNOSIS — E78.2 MIXED HYPERLIPIDEMIA: ICD-10-CM

## 2024-05-10 DIAGNOSIS — Z82.49 FAMILY HISTORY OF CAROTID ARTERY STENOSIS: ICD-10-CM

## 2024-05-10 LAB
ALBUMIN SERPL-MCNC: 4.5 G/DL (ref 3.5–5.2)
ALBUMIN/GLOB SERPL: 2 G/DL
ALP SERPL-CCNC: 58 U/L (ref 39–117)
ALT SERPL-CCNC: 12 U/L (ref 1–41)
AST SERPL-CCNC: 12 U/L (ref 1–40)
BASOPHILS # BLD AUTO: 0.02 10*3/MM3 (ref 0–0.2)
BASOPHILS NFR BLD AUTO: 0.6 % (ref 0–1.5)
BILIRUB SERPL-MCNC: 0.3 MG/DL (ref 0–1.2)
BUN SERPL-MCNC: 12 MG/DL (ref 8–23)
BUN/CREAT SERPL: 15.2 (ref 7–25)
CALCIUM SERPL-MCNC: 9.3 MG/DL (ref 8.6–10.5)
CHLORIDE SERPL-SCNC: 102 MMOL/L (ref 98–107)
CHOLEST SERPL-MCNC: 191 MG/DL (ref 0–200)
CHOLEST/HDLC SERPL: 2.73 {RATIO}
CO2 SERPL-SCNC: 26.9 MMOL/L (ref 22–29)
CREAT SERPL-MCNC: 0.79 MG/DL (ref 0.76–1.27)
EGFRCR SERPLBLD CKD-EPI 2021: 101.1 ML/MIN/1.73
EOSINOPHIL # BLD AUTO: 0.19 10*3/MM3 (ref 0–0.4)
EOSINOPHIL NFR BLD AUTO: 5.3 % (ref 0.3–6.2)
ERYTHROCYTE [DISTWIDTH] IN BLOOD BY AUTOMATED COUNT: 12 % (ref 12.3–15.4)
GLOBULIN SER CALC-MCNC: 2.3 GM/DL
GLUCOSE SERPL-MCNC: 106 MG/DL (ref 65–99)
HBA1C MFR BLD: 4.9 % (ref 4.8–5.6)
HCT VFR BLD AUTO: 43 % (ref 37.5–51)
HDLC SERPL-MCNC: 70 MG/DL (ref 40–60)
HGB BLD-MCNC: 14.9 G/DL (ref 13–17.7)
IMM GRANULOCYTES # BLD AUTO: 0.01 10*3/MM3 (ref 0–0.05)
IMM GRANULOCYTES NFR BLD AUTO: 0.3 % (ref 0–0.5)
LDLC SERPL CALC-MCNC: 110 MG/DL (ref 0–100)
LYMPHOCYTES # BLD AUTO: 1.03 10*3/MM3 (ref 0.7–3.1)
LYMPHOCYTES NFR BLD AUTO: 28.6 % (ref 19.6–45.3)
MCH RBC QN AUTO: 32 PG (ref 26.6–33)
MCHC RBC AUTO-ENTMCNC: 34.7 G/DL (ref 31.5–35.7)
MCV RBC AUTO: 92.5 FL (ref 79–97)
MONOCYTES # BLD AUTO: 0.33 10*3/MM3 (ref 0.1–0.9)
MONOCYTES NFR BLD AUTO: 9.2 % (ref 5–12)
NEUTROPHILS # BLD AUTO: 2.02 10*3/MM3 (ref 1.7–7)
NEUTROPHILS NFR BLD AUTO: 56 % (ref 42.7–76)
NRBC BLD AUTO-RTO: 0 /100 WBC (ref 0–0.2)
PLATELET # BLD AUTO: 196 10*3/MM3 (ref 140–450)
POTASSIUM SERPL-SCNC: 4.2 MMOL/L (ref 3.5–5.2)
PROT SERPL-MCNC: 6.8 G/DL (ref 6–8.5)
RBC # BLD AUTO: 4.65 10*6/MM3 (ref 4.14–5.8)
SODIUM SERPL-SCNC: 139 MMOL/L (ref 136–145)
T4 FREE SERPL-MCNC: 1.09 NG/DL (ref 0.93–1.7)
TRIGL SERPL-MCNC: 61 MG/DL (ref 0–150)
TSH SERPL DL<=0.005 MIU/L-ACNC: 2.22 UIU/ML (ref 0.27–4.2)
VLDLC SERPL CALC-MCNC: 11 MG/DL (ref 5–40)
WBC # BLD AUTO: 3.6 10*3/MM3 (ref 3.4–10.8)

## 2024-05-10 PROCEDURE — 99214 OFFICE O/P EST MOD 30 MIN: CPT | Performed by: NURSE PRACTITIONER

## 2024-05-10 RX ORDER — PANTOPRAZOLE SODIUM 40 MG/1
40 TABLET, DELAYED RELEASE ORAL DAILY
Qty: 90 TABLET | Refills: 0 | Status: SHIPPED | OUTPATIENT
Start: 2024-05-10

## 2024-05-23 ENCOUNTER — HOSPITAL ENCOUNTER (OUTPATIENT)
Dept: CARDIOLOGY | Facility: HOSPITAL | Age: 64
Discharge: HOME | End: 2024-05-23
Payer: COMMERCIAL

## 2024-05-23 DIAGNOSIS — E78.2 MIXED HYPERLIPIDEMIA: ICD-10-CM

## 2024-05-23 DIAGNOSIS — Z82.49 FAMILY HISTORY OF CAROTID ARTERY STENOSIS: ICD-10-CM

## 2024-05-23 LAB
BH CV XLRA MEAS LEFT DIST CCA EDV: -31 CM/SEC
BH CV XLRA MEAS LEFT DIST CCA PSV: -116.2 CM/SEC
BH CV XLRA MEAS LEFT DIST ICA EDV: -21.1 CM/SEC
BH CV XLRA MEAS LEFT DIST ICA PSV: -64.4 CM/SEC
BH CV XLRA MEAS LEFT ICA/CCA RATIO: 0.79
BH CV XLRA MEAS LEFT MID ICA EDV: -27.4 CM/SEC
BH CV XLRA MEAS LEFT MID ICA PSV: -75.2 CM/SEC
BH CV XLRA MEAS LEFT PROX CCA EDV: 30 CM/SEC
BH CV XLRA MEAS LEFT PROX CCA PSV: 143.3 CM/SEC
BH CV XLRA MEAS LEFT PROX ECA EDV: -25.2 CM/SEC
BH CV XLRA MEAS LEFT PROX ECA PSV: -134.6 CM/SEC
BH CV XLRA MEAS LEFT PROX ICA EDV: -18.9 CM/SEC
BH CV XLRA MEAS LEFT PROX ICA PSV: -91.1 CM/SEC
BH CV XLRA MEAS LEFT PROX SCLA PSV: 147.2 CM/SEC
BH CV XLRA MEAS LEFT VERTEBRAL A EDV: -13.8 CM/SEC
BH CV XLRA MEAS LEFT VERTEBRAL A PSV: -48.7 CM/SEC
BH CV XLRA MEAS RIGHT DIST CCA EDV: -21.4 CM/SEC
BH CV XLRA MEAS RIGHT DIST CCA PSV: -81.8 CM/SEC
BH CV XLRA MEAS RIGHT DIST ICA EDV: -42.9 CM/SEC
BH CV XLRA MEAS RIGHT DIST ICA PSV: -111.8 CM/SEC
BH CV XLRA MEAS RIGHT ICA/CCA RATIO: 1.37
BH CV XLRA MEAS RIGHT MID ICA EDV: -35.1 CM/SEC
BH CV XLRA MEAS RIGHT MID ICA PSV: -87.3 CM/SEC
BH CV XLRA MEAS RIGHT PROX CCA EDV: 24.3 CM/SEC
BH CV XLRA MEAS RIGHT PROX CCA PSV: 132.6 CM/SEC
BH CV XLRA MEAS RIGHT PROX ECA EDV: -13.7 CM/SEC
BH CV XLRA MEAS RIGHT PROX ECA PSV: -91.6 CM/SEC
BH CV XLRA MEAS RIGHT PROX ICA EDV: -25.6 CM/SEC
BH CV XLRA MEAS RIGHT PROX ICA PSV: -66.3 CM/SEC
BH CV XLRA MEAS RIGHT PROX SCLA PSV: 309.9 CM/SEC
BH CV XLRA MEAS RIGHT VERTEBRAL A EDV: -19.2 CM/SEC
BH CV XLRA MEAS RIGHT VERTEBRAL A PSV: -62.9 CM/SEC

## 2024-05-23 PROCEDURE — 93880 EXTRACRANIAL BILAT STUDY: CPT

## 2024-06-30 RX ORDER — ROSUVASTATIN CALCIUM 5 MG/1
TABLET, COATED ORAL
Qty: 45 TABLET | Refills: 1 | Status: SHIPPED | OUTPATIENT
Start: 2024-06-30

## 2024-08-16 DIAGNOSIS — I10 BENIGN ESSENTIAL HTN: ICD-10-CM

## 2024-08-16 RX ORDER — AMLODIPINE BESYLATE 5 MG/1
5 TABLET ORAL DAILY
Qty: 90 TABLET | Refills: 0 | Status: SHIPPED | OUTPATIENT
Start: 2024-08-16

## 2024-08-24 DIAGNOSIS — I10 BENIGN ESSENTIAL HTN: ICD-10-CM

## 2024-08-25 RX ORDER — LOSARTAN POTASSIUM 100 MG/1
TABLET ORAL
Qty: 90 TABLET | Refills: 1 | Status: SHIPPED | OUTPATIENT
Start: 2024-08-25

## 2024-10-22 ENCOUNTER — TELEPHONE (OUTPATIENT)
Dept: INTERNAL MEDICINE | Age: 64
End: 2024-10-22
Payer: COMMERCIAL

## 2024-10-22 DIAGNOSIS — Z00.00 PREVENTATIVE HEALTH CARE: Primary | ICD-10-CM

## 2024-11-05 RX ORDER — ROSUVASTATIN CALCIUM 5 MG/1
TABLET, COATED ORAL
Qty: 45 TABLET | Refills: 3 | Status: SHIPPED | OUTPATIENT
Start: 2024-11-05

## 2024-11-13 DIAGNOSIS — I10 BENIGN ESSENTIAL HTN: ICD-10-CM

## 2024-11-13 RX ORDER — PANTOPRAZOLE SODIUM 40 MG/1
40 TABLET, DELAYED RELEASE ORAL DAILY
Qty: 90 TABLET | Refills: 1 | Status: SHIPPED | OUTPATIENT
Start: 2024-11-13

## 2024-11-13 RX ORDER — AMLODIPINE BESYLATE 5 MG/1
5 TABLET ORAL DAILY
Qty: 90 TABLET | Refills: 1 | Status: SHIPPED | OUTPATIENT
Start: 2024-11-13

## 2024-11-14 DIAGNOSIS — E78.5 HYPERLIPIDEMIA, UNSPECIFIED HYPERLIPIDEMIA TYPE: Primary | ICD-10-CM

## 2024-11-14 RX ORDER — ROSUVASTATIN CALCIUM 5 MG/1
5 TABLET, COATED ORAL DAILY
Qty: 90 TABLET | Refills: 1 | Status: SHIPPED | OUTPATIENT
Start: 2024-11-14

## 2024-11-25 ENCOUNTER — OFFICE VISIT (OUTPATIENT)
Dept: INTERNAL MEDICINE | Age: 64
End: 2024-11-25
Payer: COMMERCIAL

## 2024-11-25 VITALS
WEIGHT: 187.6 LBS | BODY MASS INDEX: 27.78 KG/M2 | DIASTOLIC BLOOD PRESSURE: 82 MMHG | HEART RATE: 64 BPM | HEIGHT: 69 IN | TEMPERATURE: 98.7 F | OXYGEN SATURATION: 96 % | SYSTOLIC BLOOD PRESSURE: 130 MMHG

## 2024-11-25 DIAGNOSIS — Z00.00 ANNUAL PHYSICAL EXAM: Primary | ICD-10-CM

## 2024-11-25 DIAGNOSIS — E78.2 MIXED HYPERLIPIDEMIA: ICD-10-CM

## 2024-11-25 DIAGNOSIS — I10 PRIMARY HYPERTENSION: ICD-10-CM

## 2024-11-25 PROBLEM — M25.511 PAIN IN JOINT OF RIGHT SHOULDER: Status: ACTIVE | Noted: 2024-05-14

## 2024-11-25 PROBLEM — M75.81 TENDINITIS OF RIGHT ROTATOR CUFF: Status: ACTIVE | Noted: 2024-05-14

## 2024-11-25 PROCEDURE — 99396 PREV VISIT EST AGE 40-64: CPT | Performed by: NURSE PRACTITIONER

## 2024-11-25 PROCEDURE — 99214 OFFICE O/P EST MOD 30 MIN: CPT | Performed by: NURSE PRACTITIONER

## 2024-11-25 RX ORDER — AMLODIPINE BESYLATE 10 MG/1
10 TABLET ORAL DAILY
Qty: 90 TABLET | Refills: 1 | Status: SHIPPED | OUTPATIENT
Start: 2024-11-25

## 2024-11-25 NOTE — PROGRESS NOTES
"                     S K Y L E R    F R Y E ,   D N P ,  A P R N                  I  N  T  E  R  N  A  L    M  E  D  I  C  I  N  E       ENCOUNTER DATE:  11/25/2024    Antony Gimenez / 63 y.o. / male    ANNUAL PREVENTATIVE / PHYSICAL ENCOUNTER       CHIEF COMPLAINT     Hypertension, Hyperlipidemia, and Annual Exam    VITALS     Vitals:    11/25/24 0942   BP: 130/82   BP Location: Left arm   Patient Position: Sitting   Pulse: 64   Temp: 98.7 °F (37.1 °C)   TempSrc: Oral   SpO2: 96%   Weight: 85.1 kg (187 lb 9.6 oz)   Height: 175.3 cm (69\")       BP Readings from Last 3 Encounters:   11/25/24 130/82   05/10/24 128/84   11/10/23 134/88     Wt Readings from Last 3 Encounters:   11/25/24 85.1 kg (187 lb 9.6 oz)   05/10/24 86.3 kg (190 lb 3.2 oz)   11/10/23 86.2 kg (190 lb)      Body mass index is 27.7 kg/m².    MEDICATIONS     Current Outpatient Medications on File Prior to Visit   Medication Sig Dispense Refill    albuterol sulfate HFA (Proventil HFA) 108 (90 Base) MCG/ACT inhaler Inhale 2 puffs Every 4 (Four) Hours As Needed for Wheezing. 6.7 g 5    ASPIRIN 81 PO Take 81 mg by mouth Daily.      betamethasone, augmented, (DIPROLENE) 0.05 % lotion PLEASE SEE ATTACHED FOR DETAILED DIRECTIONS      clobetasol propionate (CLOBEX) 0.05 % shampoo LATHER INTO SCALP 4-5 TIMES WEEKLY, LET SIT FOR 10 MINUTES, AND RINSE THOROUGHLY      desonide (DESOWEN) 0.05 % cream APPLY BY TOPICAL ROUTE 2 TIMES EVERY DAY TO THE AFFECTED AREAS OF EARS      fluocinonide (LIDEX) 0.05 % external solution       losartan (COZAAR) 100 MG tablet TAKE 1 TABLET BY MOUTH EVERY DAY 90 tablet 1    multivitamin with minerals tablet tablet Take 1 tablet by mouth Daily.      Omega-3 Fatty Acids (fish oil) 1000 MG capsule capsule Take 1,200 mg by mouth Daily With Breakfast.      pantoprazole (PROTONIX) 40 MG EC tablet TAKE 1 TABLET BY MOUTH EVERY DAY 90 tablet 1    rosuvastatin (Crestor) 5 MG tablet Take 1 tablet by mouth Daily. 90 tablet 1    tadalafil " (CIALIS) 5 MG tablet TAKE 1 TABLET BY MOUTH EVERY DAY (PA REQUIRED)      vitamin B-12 (CYANOCOBALAMIN) 1000 MCG tablet Take 1 tablet by mouth Daily.      [DISCONTINUED] amLODIPine (NORVASC) 5 MG tablet TAKE 1 TABLET BY MOUTH EVERY DAY 90 tablet 1    [DISCONTINUED] meloxicam (MOBIC) 15 MG tablet TAKE 1 TABLET BY MOUTH EVERY DAY (Patient not taking: Reported on 11/25/2024) 90 tablet 1     No current facility-administered medications on file prior to visit.         HISTORY OF PRESENT ILLNESS      Antony presents for annual health maintenance visit.    Hypertension: Denies any chest pain, shortness of air, headache, visual disturbances, lower extremity leg swelling, or heart palpitations.  On losartan 100 mg and amlodipine 5 mg which was increased from 2.5 mg at last office visit. BP still 130/80s home and in office.      Hyperlipidemia: LDL with increase but was out of crestor. Restarted.      PSA/urine through urologist with ED.     Colonoscopy recall in 2026 r/t fam history.     Declines vaccinations.     Never a smoker.     Patient Care Team:  Favian Sanders, AMANDA, APRN as PCP - General (Internal Medicine)  Femi Edmonds MD as Consulting Physician (Urology)  Ramez Patel MD as Consulting Physician (Gastroenterology)    General health: good  Lifestyle:  Attempting to lose weight?: Yes   Diet: eats a well balanced, healthy diet  Exercise: exercises 6 days weekly  Tobacco: Never used   Alcohol: occasional/infrequent  Work: Retired  Reproductive health:  Sexually active?: Yes   Concern for STD?: No   Sexual problems?: erectile dysfunction   Sees Urologist?: Yes for ED  Depression Screening:      PHQ-2 Depression Screening  Little interest or pleasure in doing things?     Feeling down, depressed, or hopeless?     PHQ-2 Total Score        PHQ-9:  (0=not at all, 1=several days, 2=more than half, 3=nearly every day)    Past 2 weeks, how often bothered by:      (0 (MA)) 1. Little interest or pleasure      (0 (MA))  2. Feeling down, depressed or hopeless    (0 (MA)) 3. Trouble falling asleep, staying asleep, or sleeping too much    (0 (MA)) 4. Feeling tired or having little energy    (0 (MA)) 5. Poor appetite or overeating               (0 (MA)) 6. Feeling bad about self, feel like failure    (0 (MA)) 7. Trouble concentrating    (0 (MA)) 8. Moving or speaking slowly; fidgety or restless    (0 (MA)) 9. Thoughts you would better off dead or hurt self      Score: 0       PHQ-9 Score  Provisional Diagnosis          5-9    Minimal Symptoms        10-14  Minor Depression, Dysthymia, Major Depression (Mild)         15-19  Major Depression (Moderate)           >20  Major Depression (Severe)     PHQ-2: 0 (Not depressed)     PHQ-9: 0 (Negative screening for depression)    ______________________________________________________________________    ALLERGIES  No Known Allergies     PFSH:     The following portions of the patient's history were reviewed and updated as appropriate: Allergies / Current Medications / Past Medical History / Surgical History / Social History / Family History    PROBLEM LIST   Patient Active Problem List   Diagnosis    Adenocarcinoma of prostate    Benign essential HTN    ED (erectile dysfunction)    Degenerative joint disease involving multiple joints    HLD (hyperlipidemia)    Excessive urination at night    Hyperglycemia    Cervical disc disorder with radiculopathy of mid-cervical region    Weakness of right arm    Cervical disc disorder at C5-C6 level with radiculopathy    Cervical disc disorder at C4-C5 level with radiculopathy    Psoriasis    Family history of colonic polyps    Difficulty swallowing pills    Tendinitis of right rotator cuff    Pain in joint of right shoulder       PAST MEDICAL HISTORY  Past Medical History:   Diagnosis Date    Arthritis     High blood pressure     Prostate cancer        SURGICAL HISTORY  Past Surgical History:   Procedure Laterality Date    COLONOSCOPY  07/2013    Dr Barnhart-  Neg ( Fam. Hx of Polyps)    COLONOSCOPY N/A 12/15/2021    ENTIRE COLON WNL, RESCOPE IN 5 YRS, DR. MICHAEL HERNANDEZ AT [facility]    FOOT SURGERY      9/2014 and 8/2010       SOCIAL HISTORY  Social History     Socioeconomic History    Marital status:     Number of children: 2    Years of education: COLLEGE    Tobacco Use    Smoking status: Never    Smokeless tobacco: Never   Vaping Use    Vaping status: Never Used   Substance and Sexual Activity    Alcohol use: Yes     Alcohol/week: 10.0 standard drinks of alcohol     Types: 5 Glasses of wine, 5 Drinks containing 0.5 oz of alcohol per week    Drug use: No    Sexual activity: Yes     Partners: Female     Birth control/protection: None       FAMILY HISTORY  Family History   Problem Relation Age of Onset    No Known Problems Mother     Heart failure Father     Alcohol abuse Father     Cancer Brother         esophageal/stomach       IMMUNIZATION HISTORY  Immunization History   Administered Date(s) Administered    COVID-19 (PFIZER) Purple Cap Monovalent 12/17/2021, 01/07/2022         REVIEW OF SYSTEMS     Review of Systems  Constitutional: Negative.    HENT: Negative.    Eyes: Negative.    Respiratory: Negative.    Cardiovascular: Negative.    Gastrointestinal: Negative.    Endocrine: Negative.    Genitourinary: Negative.    Musculoskeletal: Right shoulder pain, plans on following with Ortho.  Skin:        Psoriasis    Allergic/Immunologic: Negative.    Neurological: Negative.    Hematological: Negative.    Psychiatric/Behavioral: Negative.      PHYSICAL EXAMINATION     Physical Exam  Constitutional:       Appearance: Normal appearance.   HENT:      Head: Normocephalic and atraumatic.      Right Ear: Tympanic membrane normal.      Left Ear: Tympanic membrane normal.      Nose: Nose normal. No congestion.      Mouth/Throat:      Mouth: Mucous membranes are moist.      Pharynx: Oropharynx is clear.   Eyes:      Conjunctiva/sclera: Conjunctivae normal.      Pupils: Pupils are  equal, round, and reactive to light.   Neck:      Vascular: No carotid bruit.   Cardiovascular:      Rate and Rhythm: Normal rate and regular rhythm.      Pulses: Normal pulses.      Heart sounds: Normal heart sounds.   Pulmonary:      Effort: Pulmonary effort is normal.      Breath sounds: Normal breath sounds.   Abdominal:      General: There is no distension.      Palpations: Abdomen is soft.      Tenderness: There is no abdominal tenderness. There is no right CVA tenderness, left CVA tenderness or guarding.   Genitourinary:     Comments: Followed by urology   Musculoskeletal:         General: Normal range of motion.      Cervical back: Normal range of motion.      Right lower leg: No edema.      Left lower leg: No edema.   Lymphadenopathy:      Cervical: No cervical adenopathy.   Skin:     General: Skin is warm and dry.      Comments: Psoriasis, followed by dermatology    Neurological:      General: No focal deficit present.      Mental Status: He is alert and oriented to person, place, and time.      Cranial Nerves: No cranial nerve deficit.      Motor: No weakness.      Coordination: Coordination normal.      Gait: Gait normal.   Psychiatric:         Mood and Affect: Mood normal.         Behavior: Behavior normal.         Thought Content: Thought content normal.         Judgment: Judgment normal.     REVIEWED DATA      Labs:    Lab Results   Component Value Date     11/18/2024    K 5.0 11/18/2024    CALCIUM 9.1 11/18/2024    AST 14 11/18/2024    ALT 11 11/18/2024    BUN 12 11/18/2024    CREATININE 0.85 11/18/2024    CREATININE 0.79 05/10/2024    CREATININE 0.92 04/03/2023    EGFRIFNONA 94 02/09/2022    EGFRIFAFRI 109 02/09/2022     Lab Results   Component Value Date    GLUCOSE 97 11/18/2024    HGBA1C 5.00 11/18/2024    HGBA1C 4.90 05/10/2024    HGBA1C 5.00 09/12/2022    TSH 1.820 11/18/2024    FREET4 1.11 11/18/2024     Lab Results   Component Value Date    PSA 0.517 09/12/2022    PSA 0.413 08/02/2021     "PSA 4.48 (H) 05/11/2015     Lab Results   Component Value Date     (H) 11/18/2024    HDL 58 11/18/2024    TRIG 72 11/18/2024    CHOLHDLRATIO 3.33 11/18/2024   No components found for: \"VCIS339P\"  Lab Results   Component Value Date    WBC 5.65 11/18/2024    HGB 14.6 11/18/2024    MCV 94.4 11/18/2024     11/18/2024     Lab Results   Component Value Date    PROTEIN Negative 11/18/2024    GLUCOSEU Negative 11/18/2024    BLOODU Negative 11/18/2024    NITRITEU Negative 11/18/2024    LEUKOCYTESUR Negative 11/18/2024   No results found for: \"HEPCVIRUSABY\"    Imaging:                   Medical Tests:                 Summary of old records / correspondence / consultant report:               Request outside records:         ASSESSMENT & PLAN     ANNUAL WELLNESS EXAM / PHYSICAL     Other medical problems addressed today:  Problem List Items Addressed This Visit       HLD (hyperlipidemia)    Relevant Medications    rosuvastatin (Crestor) 5 MG tablet     Other Visit Diagnoses       Annual physical exam    -  Primary    Primary hypertension        Relevant Medications    amLODIPine (NORVASC) 10 MG tablet             Summary/Discussion:     Increase amlodipine from 5 to 10 mg daily for HTN, monitor, goal 120s/70s, watch for ankle edema     Next Appointment with me: Visit date not found    Return in about 6 months (around 5/25/2025) for Next scheduled follow up.      HEALTHCARE MAINTENANCE ISSUES     Cancer Screening:  Colon: Initial/Next screening at age: CURRENT  Repeat colon cancer screening: every 5 years  Prostate: Sees/will see a urologist for screening.  Testicular: Recommended monthly self exam  Skin: Monthly self skin examination, annual exam by health professional  Lung: Does not meet criteria for lung cancer screening.   Other:    Screening Labs & Tests:  Lab results reviewed & discussed with with patient or orders placed today.  EKG:  CV Screening: Lipid panel  DEXA (75+ or risk factors):   HEP C (If born " [id number] or risk factors): Negative screen  Other:     Immunization/Vaccinations (to be given today unless deferred by patient)  Influenza: Patient deferred/declined flu vaccine (recommended annual vaccination)  Hepatitis A: Declined by patient  Hepatitis B: Declined by patient  Tetanus/Pertussis: Declined by patient  Pneumococcal: Not needed at this time  Shingles: Patient declines vaccine  COVID: Does not plan to get the latest booster  RSV: Not indicated  Lifestyle Counseling:  Lifestyle Modifications: Continue good lifestyle choices/modifications  Safety Issues: Always wear seatbelt, Avoid texting while driving   Use sunscreen, regular skin examination  Recommended annual dental/vision examination.  Emotional/Stress/Sleep: Reviewed and  given when appropriate      Health Maintenance   Topic Date Due    BMI FOLLOWUP  12/27/2023    ANNUAL PHYSICAL  11/10/2024    ZOSTER VACCINE (1 of 2) 11/25/2024 (Originally 11/28/2012)    COVID-19 Vaccine (3 - 2024-25 season) 11/27/2024 (Originally 9/1/2024)    INFLUENZA VACCINE  03/31/2025 (Originally 8/1/2024)    TDAP/TD VACCINES (1 - Tdap) 11/25/2025 (Originally 11/28/1981)    LIPID PANEL  11/18/2025    COLORECTAL CANCER SCREENING  12/15/2031    HEPATITIS C SCREENING  Addressed    Pneumococcal Vaccine 0-64  Aged Out

## 2025-02-27 DIAGNOSIS — I10 BENIGN ESSENTIAL HTN: ICD-10-CM

## 2025-02-27 RX ORDER — LOSARTAN POTASSIUM 100 MG/1
TABLET ORAL
Qty: 90 TABLET | Refills: 1 | Status: SHIPPED | OUTPATIENT
Start: 2025-02-27

## 2025-04-17 ENCOUNTER — HOSPITAL ENCOUNTER (OUTPATIENT)
Facility: HOSPITAL | Age: 65
Discharge: HOME | End: 2025-04-17
Admitting: NURSE PRACTITIONER
Payer: COMMERCIAL

## 2025-04-17 ENCOUNTER — OFFICE VISIT (OUTPATIENT)
Dept: INTERNAL MEDICINE | Age: 65
End: 2025-04-17
Payer: COMMERCIAL

## 2025-04-17 VITALS
OXYGEN SATURATION: 99 % | SYSTOLIC BLOOD PRESSURE: 138 MMHG | DIASTOLIC BLOOD PRESSURE: 80 MMHG | HEART RATE: 69 BPM | RESPIRATION RATE: 14 BRPM | WEIGHT: 193 LBS | BODY MASS INDEX: 28.58 KG/M2 | HEIGHT: 69 IN | TEMPERATURE: 99 F

## 2025-04-17 DIAGNOSIS — R06.02 SHORTNESS OF BREATH: ICD-10-CM

## 2025-04-17 DIAGNOSIS — R00.0 TACHYCARDIA: Primary | ICD-10-CM

## 2025-04-17 DIAGNOSIS — R94.31 ABNORMAL EKG: ICD-10-CM

## 2025-04-17 DIAGNOSIS — R05.9 COUGH, UNSPECIFIED TYPE: ICD-10-CM

## 2025-04-17 PROCEDURE — 99213 OFFICE O/P EST LOW 20 MIN: CPT | Performed by: NURSE PRACTITIONER

## 2025-04-17 PROCEDURE — 71046 X-RAY EXAM CHEST 2 VIEWS: CPT

## 2025-04-17 PROCEDURE — 93000 ELECTROCARDIOGRAM COMPLETE: CPT | Performed by: NURSE PRACTITIONER

## 2025-04-17 RX ORDER — ALBUTEROL SULFATE 90 UG/1
2 INHALANT RESPIRATORY (INHALATION) EVERY 4 HOURS PRN
Qty: 6.7 G | Refills: 5 | Status: SHIPPED | OUTPATIENT
Start: 2025-04-17

## 2025-04-17 RX ORDER — METHYLPREDNISOLONE 4 MG/1
TABLET ORAL
Qty: 21 TABLET | Refills: 0 | Status: SHIPPED | OUTPATIENT
Start: 2025-04-17

## 2025-04-17 NOTE — PROGRESS NOTES
I N T E R N A L  M E D I C I N E  CHANTEL MONGE, APRN      ENCOUNTER DATE:  04/17/2025    Antony LAGOS Gimenez / 64 y.o. / male      CHIEF COMPLAINT / REASON FOR OFFICE VISIT     Allergies, Cough (Onset  Monday 04/14/25), Wheezing, and Shortness of Breath (C/O ELEVATED HR )      ASSESSMENT & PLAN     Problem List Items Addressed This Visit    None  Visit Diagnoses         Tachycardia    -  Primary    Relevant Orders    ECG 12 Lead (Completed)      Cough, unspecified type        Relevant Medications    methylPREDNISolone (MEDROL) 4 MG dose pack    Other Relevant Orders    XR Chest PA & Lateral      Shortness of breath        Relevant Medications    albuterol sulfate HFA (Proventil HFA) 108 (90 Base) MCG/ACT inhaler    methylPREDNISolone (MEDROL) 4 MG dose pack    Other Relevant Orders    XR Chest PA & Lateral    Ambulatory Referral to Cardiology      Abnormal EKG        Relevant Orders    Ambulatory Referral to Cardiology          Orders Placed This Encounter   Procedures    XR Chest PA & Lateral    Ambulatory Referral to Cardiology    ECG 12 Lead     New Medications Ordered This Visit   Medications    albuterol sulfate HFA (Proventil HFA) 108 (90 Base) MCG/ACT inhaler     Sig: Inhale 2 puffs Every 4 (Four) Hours As Needed for Wheezing.     Dispense:  6.7 g     Refill:  5     Fill as Proventil    methylPREDNISolone (MEDROL) 4 MG dose pack     Sig: Take as directed on package instructions.     Dispense:  21 tablet     Refill:  0       SUMMARY/DISCUSSION  Suspect a component of his symptoms could be from reactive airway will go ahead and treat with Medrol Dosepak.  Further evaluate with chest x-ray today.  EKG showed first-degree AV block.  Will refer to cardiology to further evaluate.  He has no current chest pain and no signs of ischemia on EKG.    Next Appointment with me: 5/27/2025    Return for Next scheduled follow up.      VITAL SIGNS     Visit Vitals  /80   Pulse 69   Temp 99 °F (37.2 °C) (Temporal)   Resp  "14   Ht 175.3 cm (69\")   Wt 87.5 kg (193 lb)   SpO2 99%   BMI 28.50 kg/m²       Wt Readings from Last 3 Encounters:   04/17/25 87.5 kg (193 lb)   12/13/24 83.5 kg (184 lb)   11/25/24 85.1 kg (187 lb 9.6 oz)     Body mass index is 28.5 kg/m².      MEDICATIONS AT THE TIME OF OFFICE VISIT     Current Outpatient Medications on File Prior to Visit   Medication Sig    amLODIPine (NORVASC) 10 MG tablet Take 1 tablet by mouth Daily.    ASPIRIN 81 PO Take 81 mg by mouth Daily.    betamethasone, augmented, (DIPROLENE) 0.05 % lotion PLEASE SEE ATTACHED FOR DETAILED DIRECTIONS    clobetasol propionate (CLOBEX) 0.05 % shampoo LATHER INTO SCALP 4-5 TIMES WEEKLY, LET SIT FOR 10 MINUTES, AND RINSE THOROUGHLY    desonide (DESOWEN) 0.05 % cream APPLY BY TOPICAL ROUTE 2 TIMES EVERY DAY TO THE AFFECTED AREAS OF EARS    fluocinonide (LIDEX) 0.05 % external solution     losartan (COZAAR) 100 MG tablet TAKE 1 TABLET BY MOUTH EVERY DAY    multivitamin with minerals tablet tablet Take 1 tablet by mouth Daily.    Omega-3 Fatty Acids (fish oil) 1000 MG capsule capsule Take 1,200 mg by mouth Daily With Breakfast.    pantoprazole (PROTONIX) 40 MG EC tablet TAKE 1 TABLET BY MOUTH EVERY DAY    rosuvastatin (Crestor) 5 MG tablet Take 1 tablet by mouth Daily.    tadalafil (CIALIS) 5 MG tablet TAKE 1 TABLET BY MOUTH EVERY DAY (PA REQUIRED)    vitamin B-12 (CYANOCOBALAMIN) 1000 MCG tablet Take 1 tablet by mouth Daily.    [DISCONTINUED] albuterol sulfate HFA (Proventil HFA) 108 (90 Base) MCG/ACT inhaler Inhale 2 puffs Every 4 (Four) Hours As Needed for Wheezing.     No current facility-administered medications on file prior to visit.          HISTORY OF PRESENT ILLNESS             REVIEW OF SYSTEMS     Constitutional neg except per HPI   Resp chest tightness, cough, shortness of breath  CV neg     PHYSICAL EXAMINATION     Physical Exam  Constitutional  No distress  Cardiovascular Rate  normal . Rhythm: regular .   Pulmonary/Chest  Effort normal. " Breath sounds:  normal  Psychiatric  Alert. Judgment and thought content normal. Mood normal      REVIEWED DATA     Labs:   Lab Results   Component Value Date    GLUCOSE 97 11/18/2024    BUN 12 11/18/2024    CREATININE 0.85 11/18/2024    EGFR 98.9 11/18/2024    BCR 14.1 11/18/2024    K 5.0 11/18/2024    CO2 26.9 11/18/2024    CALCIUM 9.1 11/18/2024    ALBUMIN 4.2 11/18/2024    BILITOT 0.4 11/18/2024    AST 14 11/18/2024    ALT 11 11/18/2024     Lab Results   Component Value Date    WBC 5.65 11/18/2024    HGB 14.6 11/18/2024    HCT 44.2 11/18/2024    MCV 94.4 11/18/2024     11/18/2024     Lab Results   Component Value Date    CHLPL 193 11/18/2024    TRIG 72 11/18/2024    HDL 58 11/18/2024     (H) 11/18/2024      Lab Results   Component Value Date    TSH 1.820 11/18/2024     Brief Urine Lab Results  (Last result in the past 365 days)        Color   Clarity   Blood   Leuk Est   Nitrite   Protein   CREAT   Urine HCG        11/18/24 0956 Yellow   Clear   Negative   Negative   Negative   Negative                 Lab Results   Component Value Date    HGBA1C 5.00 11/18/2024       Imaging:           Medical Tests:       ECG 12 Lead    Date/Time: 4/17/2025 12:05 PM  Performed by: Favian Sanders DNP, APRN    Authorized by: Favian Sanders DNP, JO ANN  Comparison: not compared with previous ECG   Rhythm: sinus rhythm  Conduction: 1st degree AV block    Clinical impression: abnormal EKG          Summary of old records / correspondence / consultant report:           Request outside records:

## 2025-04-30 ENCOUNTER — OFFICE VISIT (OUTPATIENT)
Dept: CARDIOLOGY | Age: 65
End: 2025-04-30
Payer: COMMERCIAL

## 2025-04-30 VITALS
WEIGHT: 187.4 LBS | DIASTOLIC BLOOD PRESSURE: 82 MMHG | HEIGHT: 69 IN | BODY MASS INDEX: 27.76 KG/M2 | HEART RATE: 81 BPM | SYSTOLIC BLOOD PRESSURE: 142 MMHG

## 2025-04-30 DIAGNOSIS — I10 BENIGN ESSENTIAL HTN: Primary | ICD-10-CM

## 2025-04-30 DIAGNOSIS — L40.9 PSORIASIS: ICD-10-CM

## 2025-04-30 DIAGNOSIS — M15.9 OSTEOARTHRITIS OF MULTIPLE JOINTS, UNSPECIFIED OSTEOARTHRITIS TYPE: ICD-10-CM

## 2025-04-30 DIAGNOSIS — C61 ADENOCARCINOMA OF PROSTATE: ICD-10-CM

## 2025-04-30 DIAGNOSIS — E78.2 MIXED HYPERLIPIDEMIA: ICD-10-CM

## 2025-04-30 PROCEDURE — 99204 OFFICE O/P NEW MOD 45 MIN: CPT | Performed by: INTERNAL MEDICINE

## 2025-04-30 PROCEDURE — 93000 ELECTROCARDIOGRAM COMPLETE: CPT | Performed by: INTERNAL MEDICINE

## 2025-04-30 RX ORDER — HYDROCHLOROTHIAZIDE 25 MG/1
25 TABLET ORAL DAILY
Qty: 90 TABLET | Refills: 3 | Status: SHIPPED | OUTPATIENT
Start: 2025-04-30

## 2025-04-30 RX ORDER — ROSUVASTATIN CALCIUM 10 MG/1
10 TABLET, COATED ORAL DAILY
Qty: 90 TABLET | Refills: 3 | Status: SHIPPED | OUTPATIENT
Start: 2025-04-30 | End: 2025-07-29

## 2025-04-30 NOTE — PROGRESS NOTES
CARDIOLOGY    Nick Valderrama MD    ENCOUNTER DATE:  04/30/25      Antony Gimenez / 64 y.o. / male        CHIEF COMPLAINT / REASON FOR OFFICE VISIT     NEW PATIENT  (ABNORMAL EKG, TACHYCARDIA, SOA & COUGH)      HISTORY OF PRESENT ILLNESS       HPI    Antony Gimenez is a 64 y.o. male with hypertension, hyperlipidemia, prostate cancer, psoriasis, DJD, alcohol abuse, overweight who presents to establish care for first-degree AV block.    ECG this month 4/2025 at PCP office in the setting of bronchitis showed NSR with first-degree AV block. Today, patient has no acute complaints. His bronchitis is mostly resolved. Retired from legal compliance. Stays active with regular walking and hiking up to 20 miles per week. Minimal residual dyspnea or cough. Chronic palpitation for decades with negative work-up in the late 1990s that were normal. Denies chest pain, leg edema, orthopnea, PND, dizziness, syncope, bleeding, or unexpected falls. Does not recall having had a heart attack or stroke. Former social smoker while young. Consumes 3 beverages per day, denies substance.     REVIEW OF SYSTEMS     Review of Systems   Constitutional: Negative for weight loss.   Cardiovascular:  Positive for dyspnea on exertion and palpitations. Negative for chest pain, leg swelling, orthopnea, paroxysmal nocturnal dyspnea and syncope.   Respiratory:  Positive for cough and shortness of breath.    Hematologic/Lymphatic: Negative for bleeding problem.   Musculoskeletal:  Negative for falls.   Gastrointestinal:  Negative for hematochezia and melena.   Genitourinary:  Negative for hematuria.   Neurological:  Negative for dizziness and light-headedness.   Psychiatric/Behavioral:  Negative for altered mental status.            MEDICATIONS      Outpatient Encounter Medications as of 4/30/2025   Medication Sig Dispense Refill    albuterol sulfate HFA (Proventil HFA) 108 (90 Base) MCG/ACT inhaler Inhale 2 puffs Every 4 (Four) Hours As Needed for  "Wheezing. 6.7 g 5    amLODIPine (NORVASC) 10 MG tablet Take 1 tablet by mouth Daily. 90 tablet 1    ASPIRIN 81 PO Take 81 mg by mouth Daily.      betamethasone, augmented, (DIPROLENE) 0.05 % lotion PLEASE SEE ATTACHED FOR DETAILED DIRECTIONS      clobetasol propionate (CLOBEX) 0.05 % shampoo LATHER INTO SCALP 4-5 TIMES WEEKLY, LET SIT FOR 10 MINUTES, AND RINSE THOROUGHLY      desonide (DESOWEN) 0.05 % cream APPLY BY TOPICAL ROUTE 2 TIMES EVERY DAY TO THE AFFECTED AREAS OF EARS      fluocinonide (LIDEX) 0.05 % external solution       losartan (COZAAR) 100 MG tablet TAKE 1 TABLET BY MOUTH EVERY DAY 90 tablet 1    multivitamin with minerals tablet tablet Take 1 tablet by mouth Daily.      Omega-3 Fatty Acids (fish oil) 1000 MG capsule capsule Take 1,200 mg by mouth Daily With Breakfast.      pantoprazole (PROTONIX) 40 MG EC tablet TAKE 1 TABLET BY MOUTH EVERY DAY 90 tablet 1    rosuvastatin (Crestor) 5 MG tablet Take 1 tablet by mouth Daily. 90 tablet 1    tadalafil (CIALIS) 5 MG tablet TAKE 1 TABLET BY MOUTH EVERY DAY (PA REQUIRED)      vitamin B-12 (CYANOCOBALAMIN) 1000 MCG tablet Take 1 tablet by mouth Daily.      methylPREDNISolone (MEDROL) 4 MG dose pack Take as directed on package instructions. (Patient not taking: Reported on 4/30/2025) 21 tablet 0     No facility-administered encounter medications on file as of 4/30/2025.         VITAL SIGNS     Visit Vitals  /82   Pulse 81   Ht 175.3 cm (69.02\")   Wt 85 kg (187 lb 6.4 oz)   BMI 27.66 kg/m²         Wt Readings from Last 3 Encounters:   04/30/25 85 kg (187 lb 6.4 oz)   04/17/25 87.5 kg (193 lb)   12/13/24 83.5 kg (184 lb)     Body mass index is 27.66 kg/m².      PHYSICAL EXAMINATION     Vitals and nursing note reviewed.   Constitutional:       Appearance: Healthy appearance. Not in distress.   Neck:      Vascular: No JVR.   Pulmonary:      Effort: Pulmonary effort is normal.      Breath sounds: Normal breath sounds. No wheezing. No rhonchi. No rales. "   Cardiovascular:      Normal rate. Regular rhythm.      Murmurs: There is a grade 2/6 systolic murmur.   Edema:     Peripheral edema absent.   Abdominal:      General: There is no distension.      Palpations: Abdomen is soft.      Tenderness: There is no abdominal tenderness.   Musculoskeletal:      Cervical back: Neck supple. Skin:     General: Skin is cool.   Neurological:      Mental Status: Alert and oriented to person, place and time.         REVIEWED DATA       ECG 12 Lead    Date/Time: 4/30/2025 1:59 PM  Performed by: Nick Valderrama MD    Authorized by: Nick Valderrama MD  Comparison: compared with previous ECG from 4/17/2025  Similar to previous ECG  Rhythm: sinus rhythm  Q waves: V1 and V2      Clinical impression: non-specific ECG        Lab Results   Component Value Date    WBC 5.65 11/18/2024    HGB 14.6 11/18/2024    HCT 44.2 11/18/2024    MCV 94.4 11/18/2024     11/18/2024       Lab Results   Component Value Date    HGBA1C 5.00 11/18/2024       Lab Results   Component Value Date    GLUCOSE 97 11/18/2024    BUN 12 11/18/2024    CREATININE 0.85 11/18/2024    EGFR 98.9 11/18/2024    BCR 14.1 11/18/2024    K 5.0 11/18/2024    CO2 26.9 11/18/2024    CALCIUM 9.1 11/18/2024    ALBUMIN 4.2 11/18/2024    BILITOT 0.4 11/18/2024    AST 14 11/18/2024    ALT 11 11/18/2024       Lab Results   Component Value Date    CHLPL 193 11/18/2024    TRIG 72 11/18/2024    HDL 58 11/18/2024     (H) 11/18/2024             ASSESSMENT & PLAN     Diagnoses and all orders for this visit:    1. Benign essential HTN (Primary)    2. Mixed hyperlipidemia    3. Adenocarcinoma of prostate  Overview:  Description: Per prostate biopsyâ€“07/08/2015. Dr palencia- [facility].      4. Osteoarthritis of multiple joints, unspecified osteoarthritis type    5. Psoriasis       First-degree AV block: ECG this month 4/2025 at PCP office showed NSR with first-degree AV block and septal Q waves.  Repeat ECG in office today showed same  findings.  Patient otherwise fairly active without significant exertional symptoms.  He has a 2/6 systolic murmur which he says has been present for a while, we will further evaluate with outpatient echocardiogram.  No present need for ischemic evaluation given good functional status.  See below regarding BP and lipid control.  Hypertension: In office /82, mildly elevated, HR 81.  On amlodipine 10 daily, losartan 100 daily, will add HCTZ 25 daily and have patient call back in 1-2 weeks with home vitals  Hyperlipidemia: Lipid panel last year 11/2024 showed HDL 58, , suboptimal.  ASCVD risk further increased by known history of cancer and psoriasis.  On rosuvastatin 5 daily, will increase to 10 daily and repeat lipids in 3 months along with LP(a) to help guide further therapy adjustment.  Prostate cancer: Diagnosed in 2017 requiring radiation, follows urology.   Psoriasis: Well controlled. Follows dermatology.  Overweight: BMI 28. Discussed lifestyle modification including dietary changes to assist with weight loss.  Alcohol use/abuse: Reports 3 alcoholic beverages per night, encourage cutting down.    I spent 38 minutes caring for Antony on this date of service. This time includes time spent by me in the following activities: preparing for the visit, reviewing tests, performing a medically appropriate examination and/or evaluation, counseling and educating the patient/family/caregiver, and referring and communicating with other health care professionals.     Additionally, I am providing longitudinal care which includes continuously being a focal point for all of the patient's health care services and ongoing medical care related to first-degree AV block, hypertension, hyperlipidemia as documented above.    Nick Valderrama MD. PhD. [facility]  04/30/25  13:40 EDT    Part of this note may be an electronic transcription/translation of spoken language to printed text using the Dragon dictation system.

## 2025-05-07 DIAGNOSIS — E78.5 HYPERLIPIDEMIA, UNSPECIFIED HYPERLIPIDEMIA TYPE: ICD-10-CM

## 2025-05-07 RX ORDER — ROSUVASTATIN CALCIUM 5 MG/1
5 TABLET, COATED ORAL DAILY
Qty: 90 TABLET | Refills: 1 | OUTPATIENT
Start: 2025-05-07

## 2025-05-07 RX ORDER — PANTOPRAZOLE SODIUM 40 MG/1
40 TABLET, DELAYED RELEASE ORAL DAILY
Qty: 90 TABLET | Refills: 1 | Status: SHIPPED | OUTPATIENT
Start: 2025-05-07

## 2025-05-14 ENCOUNTER — HOSPITAL ENCOUNTER (OUTPATIENT)
Dept: CARDIOLOGY | Facility: HOSPITAL | Age: 65
Discharge: HOME | End: 2025-05-14
Admitting: INTERNAL MEDICINE
Payer: COMMERCIAL

## 2025-05-14 VITALS
HEIGHT: 69 IN | BODY MASS INDEX: 27.7 KG/M2 | SYSTOLIC BLOOD PRESSURE: 144 MMHG | WEIGHT: 187 LBS | DIASTOLIC BLOOD PRESSURE: 84 MMHG

## 2025-05-14 DIAGNOSIS — M15.9 OSTEOARTHRITIS OF MULTIPLE JOINTS, UNSPECIFIED OSTEOARTHRITIS TYPE: ICD-10-CM

## 2025-05-14 DIAGNOSIS — I10 BENIGN ESSENTIAL HTN: ICD-10-CM

## 2025-05-14 DIAGNOSIS — E78.2 MIXED HYPERLIPIDEMIA: ICD-10-CM

## 2025-05-14 DIAGNOSIS — C61 ADENOCARCINOMA OF PROSTATE: ICD-10-CM

## 2025-05-14 LAB
AORTIC ARCH: 2.5 CM
AORTIC DIMENSIONLESS INDEX: 0.82 (DI)
ASCENDING AORTA: 3.8 CM
AV MEAN PRESS GRAD SYS DOP V1V2: 4 MMHG
AV VMAX SYS DOP: 134 CM/SEC
BH CV ECHO MEAS - ACS: 1.83 CM
BH CV ECHO MEAS - AO MAX PG: 7.2 MMHG
BH CV ECHO MEAS - AO ROOT DIAM: 3.7 CM
BH CV ECHO MEAS - AO V2 VTI: 24 CM
BH CV ECHO MEAS - AVA(I,D): 3.1 CM2
BH CV ECHO MEAS - EDV(CUBED): 170.6 ML
BH CV ECHO MEAS - EDV(MOD-SP2): 163 ML
BH CV ECHO MEAS - EDV(MOD-SP4): 181 ML
BH CV ECHO MEAS - EF(MOD-SP2): 62 %
BH CV ECHO MEAS - EF(MOD-SP4): 60.2 %
BH CV ECHO MEAS - ESV(CUBED): 32.2 ML
BH CV ECHO MEAS - ESV(MOD-SP2): 62 ML
BH CV ECHO MEAS - ESV(MOD-SP4): 72 ML
BH CV ECHO MEAS - FS: 42.6 %
BH CV ECHO MEAS - IVS/LVPW: 1.07 CM
BH CV ECHO MEAS - IVSD: 0.82 CM
BH CV ECHO MEAS - LAT PEAK E' VEL: 12 CM/SEC
BH CV ECHO MEAS - LV DIASTOLIC VOL/BSA (35-75): 90.2 CM2
BH CV ECHO MEAS - LV MASS(C)D: 160 GRAMS
BH CV ECHO MEAS - LV MAX PG: 5.5 MMHG
BH CV ECHO MEAS - LV MEAN PG: 3 MMHG
BH CV ECHO MEAS - LV SYSTOLIC VOL/BSA (12-30): 35.9 CM2
BH CV ECHO MEAS - LV V1 MAX: 117 CM/SEC
BH CV ECHO MEAS - LV V1 VTI: 19.6 CM
BH CV ECHO MEAS - LVIDD: 5.5 CM
BH CV ECHO MEAS - LVIDS: 3.2 CM
BH CV ECHO MEAS - LVOT AREA: 3.7 CM2
BH CV ECHO MEAS - LVOT DIAM: 2.18 CM
BH CV ECHO MEAS - LVPWD: 0.76 CM
BH CV ECHO MEAS - MED PEAK E' VEL: 8.9 CM/SEC
BH CV ECHO MEAS - MR MAX PG: 98.4 MMHG
BH CV ECHO MEAS - MR MAX VEL: 496.1 CM/SEC
BH CV ECHO MEAS - MV A DUR: 0.13 SEC
BH CV ECHO MEAS - MV A MAX VEL: 78.5 CM/SEC
BH CV ECHO MEAS - MV DEC SLOPE: 389.4 CM/SEC2
BH CV ECHO MEAS - MV DEC TIME: 0.26 SEC
BH CV ECHO MEAS - MV E MAX VEL: 73.7 CM/SEC
BH CV ECHO MEAS - MV E/A: 0.94
BH CV ECHO MEAS - MV MAX PG: 3.3 MMHG
BH CV ECHO MEAS - MV MEAN PG: 1.45 MMHG
BH CV ECHO MEAS - MV P1/2T: 74.4 MSEC
BH CV ECHO MEAS - MV V2 VTI: 26.4 CM
BH CV ECHO MEAS - MVA(P1/2T): 3 CM2
BH CV ECHO MEAS - MVA(VTI): 2.8 CM2
BH CV ECHO MEAS - PA ACC TIME: 0.16 SEC
BH CV ECHO MEAS - PA V2 MAX: 111.3 CM/SEC
BH CV ECHO MEAS - PULM A REVS DUR: 0.12 SEC
BH CV ECHO MEAS - PULM A REVS VEL: 42.2 CM/SEC
BH CV ECHO MEAS - PULM DIAS VEL: 39.3 CM/SEC
BH CV ECHO MEAS - PULM S/D: 1.38
BH CV ECHO MEAS - PULM SYS VEL: 54.3 CM/SEC
BH CV ECHO MEAS - QP/QS: 0.72
BH CV ECHO MEAS - RAP SYSTOLE: 3 MMHG
BH CV ECHO MEAS - RV MAX PG: 2.06 MMHG
BH CV ECHO MEAS - RV V1 MAX: 71.7 CM/SEC
BH CV ECHO MEAS - RV V1 VTI: 14.2 CM
BH CV ECHO MEAS - RVOT DIAM: 2.17 CM
BH CV ECHO MEAS - SUP REN AO DIAM: 2.1 CM
BH CV ECHO MEAS - SV(LVOT): 73.5 ML
BH CV ECHO MEAS - SV(MOD-SP2): 101 ML
BH CV ECHO MEAS - SV(MOD-SP4): 109 ML
BH CV ECHO MEAS - SV(RVOT): 52.7 ML
BH CV ECHO MEAS - SVI(LVOT): 36.6 ML/M2
BH CV ECHO MEAS - SVI(MOD-SP2): 50.3 ML/M2
BH CV ECHO MEAS - SVI(MOD-SP4): 54.3 ML/M2
BH CV ECHO MEAS - TAPSE (>1.6): 2.5 CM
BH CV ECHO MEASUREMENTS AVERAGE E/E' RATIO: 7.05
BH CV XLRA - RV BASE: 2.9 CM
BH CV XLRA - RV LENGTH: 8.2 CM
BH CV XLRA - RV MID: 3.3 CM
BH CV XLRA - TDI S': 14.7 CM/SEC
LEFT ATRIUM VOLUME INDEX: 18.1 ML/M2
LV EF BIPLANE MOD: 61.2 %
SINUS: 3.7 CM
STJ: 2.9 CM

## 2025-05-14 PROCEDURE — 93306 TTE W/DOPPLER COMPLETE: CPT

## 2025-05-22 DIAGNOSIS — I10 PRIMARY HYPERTENSION: ICD-10-CM

## 2025-05-22 RX ORDER — AMLODIPINE BESYLATE 10 MG/1
10 TABLET ORAL DAILY
Qty: 90 TABLET | Refills: 1 | Status: SHIPPED | OUTPATIENT
Start: 2025-05-22

## 2025-05-27 ENCOUNTER — OFFICE VISIT (OUTPATIENT)
Dept: INTERNAL MEDICINE | Age: 65
End: 2025-05-27
Payer: COMMERCIAL

## 2025-05-27 VITALS
OXYGEN SATURATION: 100 % | WEIGHT: 191.6 LBS | BODY MASS INDEX: 28.38 KG/M2 | HEART RATE: 80 BPM | TEMPERATURE: 97.4 F | DIASTOLIC BLOOD PRESSURE: 82 MMHG | SYSTOLIC BLOOD PRESSURE: 138 MMHG | HEIGHT: 69 IN

## 2025-05-27 DIAGNOSIS — I10 BENIGN ESSENTIAL HTN: Primary | ICD-10-CM

## 2025-05-27 DIAGNOSIS — Z00.00 ROUTINE ADULT HEALTH MAINTENANCE: ICD-10-CM

## 2025-05-27 DIAGNOSIS — I10 PRIMARY HYPERTENSION: Primary | ICD-10-CM

## 2025-05-27 DIAGNOSIS — E78.2 MIXED HYPERLIPIDEMIA: ICD-10-CM

## 2025-05-27 PROBLEM — M65.811 OTHER SYNOVITIS AND TENOSYNOVITIS, RIGHT SHOULDER: Status: ACTIVE | Noted: 2024-05-14

## 2025-05-27 NOTE — PROGRESS NOTES
"    I N T E R N A L  M E D I C I N E  CHANTEL MONGE, JO ANN      ENCOUNTER DATE:  05/27/2025    Antony LAGOS Gimenez / 64 y.o. / male      CHIEF COMPLAINT / REASON FOR OFFICE VISIT     Hypertension and Hyperlipidemia      ASSESSMENT & PLAN     Problem List Items Addressed This Visit       Benign essential HTN - Primary    Relevant Medications    losartan (COZAAR) 100 MG tablet    hydroCHLOROthiazide 25 MG tablet    amLODIPine (NORVASC) 10 MG tablet    HLD (hyperlipidemia)    Relevant Medications    rosuvastatin (CRESTOR) 10 MG tablet     No orders of the defined types were placed in this encounter.    No orders of the defined types were placed in this encounter.      SUMMARY/DISCUSSION  I have reached out the patient's cardiologist to see if we can add a CMP to his labs in 3 months for recheck on increased cholesterol medication.  Continue to monitor blood pressure at home.      Next Appointment with me: Visit date not found    Return in about 6 months (around 11/27/2025) for Annual physical with labs week prior .      VITAL SIGNS     Visit Vitals  /82   Pulse 80   Temp 97.4 °F (36.3 °C)   Ht 175.3 cm (69.02\")   Wt 86.9 kg (191 lb 9.6 oz)   SpO2 100%   BMI 28.28 kg/m²       Wt Readings from Last 3 Encounters:   05/27/25 86.9 kg (191 lb 9.6 oz)   05/14/25 84.8 kg (187 lb)   04/30/25 85 kg (187 lb 6.4 oz)     Body mass index is 28.28 kg/m².      MEDICATIONS AT THE TIME OF OFFICE VISIT     Current Outpatient Medications on File Prior to Visit   Medication Sig    albuterol sulfate HFA (Proventil HFA) 108 (90 Base) MCG/ACT inhaler Inhale 2 puffs Every 4 (Four) Hours As Needed for Wheezing.    amLODIPine (NORVASC) 10 MG tablet TAKE 1 TABLET BY MOUTH EVERY DAY    ASPIRIN 81 PO Take 81 mg by mouth Daily.    betamethasone, augmented, (DIPROLENE) 0.05 % lotion PLEASE SEE ATTACHED FOR DETAILED DIRECTIONS    desonide (DESOWEN) 0.05 % cream APPLY BY TOPICAL ROUTE 2 TIMES EVERY DAY TO THE AFFECTED AREAS OF EARS    fluocinonide " (LIDEX) 0.05 % external solution     hydroCHLOROthiazide 25 MG tablet Take 1 tablet by mouth Daily.    losartan (COZAAR) 100 MG tablet TAKE 1 TABLET BY MOUTH EVERY DAY    multivitamin with minerals tablet tablet Take 1 tablet by mouth Daily.    Omega-3 Fatty Acids (fish oil) 1000 MG capsule capsule Take 1,200 mg by mouth Daily With Breakfast.    pantoprazole (PROTONIX) 40 MG EC tablet TAKE 1 TABLET BY MOUTH EVERY DAY    rosuvastatin (CRESTOR) 10 MG tablet Take 1 tablet by mouth Daily for 90 days.    tadalafil (CIALIS) 5 MG tablet TAKE 1 TABLET BY MOUTH EVERY DAY (PA REQUIRED)    vitamin B-12 (CYANOCOBALAMIN) 1000 MCG tablet Take 1 tablet by mouth Daily.    [DISCONTINUED] clobetasol propionate (CLOBEX) 0.05 % shampoo LATHER INTO SCALP 4-5 TIMES WEEKLY, LET SIT FOR 10 MINUTES, AND RINSE THOROUGHLY (Patient not taking: Reported on 5/27/2025)    [DISCONTINUED] methylPREDNISolone (MEDROL) 4 MG dose pack Take as directed on package instructions. (Patient not taking: Reported on 5/27/2025)     No current facility-administered medications on file prior to visit.          HISTORY OF PRESENT ILLNESS     Hypertension: Blood pressure in the 130s over 70s at home.  Hydrochlorothiazide was increased by cardiology to 25 mg and he was continued on amlodipine 10 and losartan 100 mg.  No chest pain or shortness of breath no palpitations.    Hyperlipidemia: Recent echocardiogram showed moderate calcification of the aortic valve.  Rosuvastatin was increased from 5 to 10 mg by cardiology.  No issues with  muscle aches or pains with increase.    Echo was updated which showed EF of 61.2% due to first-degree AV block, mild systolic murmur.     PSA/urine through urologist with ED.      Colonoscopy recall in 2026 r/t fam history.      Declines vaccinations.      Never a smoker.        REVIEW OF SYSTEMS     Constitutional neg except per HPI   Resp neg  CV neg    PHYSICAL EXAMINATION     Physical Exam  Constitutional  No  distress  Cardiovascular Rate  normal . Rhythm: regular . Heart sounds:  murmur   Pulmonary/Chest  Effort normal. Breath sounds:  normal  Psychiatric  Alert. Judgment and thought content normal. Mood normal      REVIEWED DATA     Labs:   Lab Results   Component Value Date    GLUCOSE 97 11/18/2024    BUN 12 11/18/2024    CREATININE 0.85 11/18/2024    EGFR 98.9 11/18/2024    BCR 14.1 11/18/2024    K 5.0 11/18/2024    CO2 26.9 11/18/2024    CALCIUM 9.1 11/18/2024    ALBUMIN 4.2 11/18/2024    BILITOT 0.4 11/18/2024    AST 14 11/18/2024    ALT 11 11/18/2024     Lab Results   Component Value Date    WBC 5.65 11/18/2024    HGB 14.6 11/18/2024    HCT 44.2 11/18/2024    MCV 94.4 11/18/2024     11/18/2024     Lab Results   Component Value Date    CHLPL 193 11/18/2024    TRIG 72 11/18/2024    HDL 58 11/18/2024     (H) 11/18/2024      Lab Results   Component Value Date    TSH 1.820 11/18/2024     Brief Urine Lab Results  (Last result in the past 365 days)        Color   Clarity   Blood   Leuk Est   Nitrite   Protein   CREAT   Urine HCG        11/18/24 0956 Yellow   Clear   Negative   Negative   Negative   Negative                 Lab Results   Component Value Date    HGBA1C 5.00 11/18/2024       Imaging:           Medical Tests:           Summary of old records / correspondence / consultant report:           Request outside records:

## (undated) DEVICE — ADAPT CLN BIOGUARD AIR/H2O DISP

## (undated) DEVICE — CANN O2 ETCO2 FITS ALL CONN CO2 SMPL A/ 7IN DISP LF

## (undated) DEVICE — KT ORCA ORCAPOD DISP STRL

## (undated) DEVICE — TUBING, SUCTION, 1/4" X 10', STRAIGHT: Brand: MEDLINE

## (undated) DEVICE — LN SMPL CO2 SHTRM SD STREAM W/M LUER

## (undated) DEVICE — SENSR O2 OXIMAX FNGR A/ 18IN NONSTR

## (undated) DEVICE — GOWN SURG AERO CHROME XL